# Patient Record
Sex: FEMALE | Race: WHITE | NOT HISPANIC OR LATINO | Employment: UNEMPLOYED | URBAN - METROPOLITAN AREA
[De-identification: names, ages, dates, MRNs, and addresses within clinical notes are randomized per-mention and may not be internally consistent; named-entity substitution may affect disease eponyms.]

---

## 2018-10-24 ENCOUNTER — NURSE TRIAGE (OUTPATIENT)
Dept: ADMINISTRATIVE | Facility: CLINIC | Age: 58
End: 2018-10-24

## 2018-10-24 NOTE — TELEPHONE ENCOUNTER
"    Reason for Disposition   MODERATE difficulty breathing (e.g., speaks in phrases, SOB even at rest, pulse 100-120) of new onset or worse than normal    Protocols used: ST BREATHING DIFFICULTY-A-OH    Fidelina called for appt this afternoon, and  sent her call to triage line due to her symptoms.  She is reporting SOB, with a feeling that "I can't get enough air.  I feel like I have to take a very deep breath to get enough air, and it is not enough. I am also ligththeaded and feel like I am going to pass out when I stand up.  I look in the mirror and I look very pale to me."  Self-administered two albuterol inhaler treatments today, but states they did not help. She went to [facility] ED two weeks ago for the same problems, and feels she is feeling worse now and states "little was done for me there. Fidelina states she has been caring for her older brother, and is under a lot of stress, as she is trying to find a place for him to live. She states her pcp is Ale Handy MD , with [facility], and I asked if she has followed up with Dr Mcdaniel since her last ED visit.  She states she did speak with her, and Dr Mcdaniel put in referral to Dr Mani Trent, cardiology.  Fidelina has consult appt with Dr Trent 11/01/18 but has not seen him yet in any capacity.  Message to Dr Mcdaniel and Dr Trent.  Please contact caller directly with any additional care advice.    "

## 2018-11-05 ENCOUNTER — OFFICE VISIT (OUTPATIENT)
Dept: CARDIOLOGY | Facility: CLINIC | Age: 58
End: 2018-11-05
Payer: COMMERCIAL

## 2018-11-05 VITALS
HEART RATE: 68 BPM | DIASTOLIC BLOOD PRESSURE: 95 MMHG | BODY MASS INDEX: 30.04 KG/M2 | SYSTOLIC BLOOD PRESSURE: 133 MMHG | HEIGHT: 65 IN | WEIGHT: 180.31 LBS | OXYGEN SATURATION: 98 %

## 2018-11-05 DIAGNOSIS — I10 HYPERTENSION: ICD-10-CM

## 2018-11-05 DIAGNOSIS — R76.8 POSITIVE ANA (ANTINUCLEAR ANTIBODY): ICD-10-CM

## 2018-11-05 DIAGNOSIS — R07.89 CHEST PAIN, ATYPICAL: ICD-10-CM

## 2018-11-05 DIAGNOSIS — M79.7 FIBROMYALGIA: ICD-10-CM

## 2018-11-05 DIAGNOSIS — I10 HYPERTENSION, UNSPECIFIED TYPE: ICD-10-CM

## 2018-11-05 DIAGNOSIS — E78.2 MIXED HYPERLIPIDEMIA: Primary | ICD-10-CM

## 2018-11-05 PROCEDURE — 99204 OFFICE O/P NEW MOD 45 MIN: CPT | Mod: S$GLB,,, | Performed by: INTERNAL MEDICINE

## 2018-11-05 PROCEDURE — 99999 PR PBB SHADOW E&M-EST. PATIENT-LVL III: CPT | Mod: PBBFAC,,, | Performed by: INTERNAL MEDICINE

## 2018-11-05 PROCEDURE — 93005 ELECTROCARDIOGRAM TRACING: CPT | Mod: S$GLB,,, | Performed by: INTERNAL MEDICINE

## 2018-11-05 PROCEDURE — 93010 ELECTROCARDIOGRAM REPORT: CPT | Mod: S$GLB,,, | Performed by: INTERNAL MEDICINE

## 2018-11-05 RX ORDER — ZOLPIDEM TARTRATE 5 MG/1
5 TABLET ORAL NIGHTLY PRN
COMMUNITY

## 2018-11-05 NOTE — PROGRESS NOTES
"Subjective:    Patient ID:  Fidelina Schuler is a 58 y.o. female who presents for follow-up of Chest Pain      HPI     Referred by Dr Mcdaniel    Called triage nurse 10/24/18  Protocols used: ST BREATHING DIFFICULTY-A-OH     Fidelina called for appt this afternoon, and  sent her call to triage line due to her symptoms.  She is reporting SOB, with a feeling that "I can't get enough air.  I feel like I have to take a very deep breath to get enough air, and it is not enough. I am also ligththeaded and feel like I am going to pass out when I stand up.  I look in the mirror and I look very pale to me."  Self-administered two albuterol inhaler treatments today, but states they did not help. She went to [facility] ED two weeks ago for the same problems, and feels she is feeling worse now and states "little was done for me there. Fidelina states she has been caring for her older brother, and is under a lot of stress, as she is trying to find a place for him to live. She states her pcp is Ale Handy MD , with [facility], and I asked if she has followed up with Dr Mcdaniel since her last ED visit.  She states she did speak with her, and Dr Mcdaniel put in referral to Dr Mani Trent, cardiology.  Fidelina has consult appt with Dr Trent 11/01/18 but has not seen him yet in any capacity.  Message to Dr Mcdaniel and Dr Trent.  Please contact caller directly with any additional care advice.    [facility]  Reports normal LHC 2 years ago  Has had 2 ER visits for CP and SOB in the last 2 weeks  EKG NSR TWI inferiorly and anteriorly             Review of Systems   Constitution: Negative for decreased appetite.   HENT: Negative for ear discharge.    Eyes: Negative for blurred vision.   Respiratory: Negative for hemoptysis.    Endocrine: Negative for polyphagia.   Hematologic/Lymphatic: Negative for adenopathy.   Skin: Negative for color change.   Musculoskeletal: Negative for joint swelling. "   Neurological: Negative for brief paralysis.   Psychiatric/Behavioral: Negative for hallucinations.        Objective:    Physical Exam   Constitutional: She is oriented to person, place, and time. She appears well-developed and well-nourished.   HENT:   Head: Normocephalic and atraumatic.   Eyes: Conjunctivae are normal. Pupils are equal, round, and reactive to light.   Neck: Normal range of motion. Neck supple.   Cardiovascular: Normal rate, normal heart sounds and intact distal pulses.   Pulmonary/Chest: Effort normal and breath sounds normal.   Abdominal: Soft. Bowel sounds are normal.   Musculoskeletal: Normal range of motion.   Neurological: She is alert and oriented to person, place, and time.   Skin: Skin is warm and dry.         Assessment:       1. Mixed hyperlipidemia    2. Hypertension, unspecified type    3. Positive ENRIQUE (antinuclear antibody)    4. Fibromyalgia    5. Chest pain, atypical         Plan:       Stress echo for FLORES and CP

## 2018-11-12 ENCOUNTER — HOSPITAL ENCOUNTER (OUTPATIENT)
Dept: CARDIOLOGY | Facility: HOSPITAL | Age: 58
Discharge: HOME | End: 2018-11-12
Attending: INTERNAL MEDICINE
Payer: COMMERCIAL

## 2018-11-12 VITALS — HEIGHT: 65 IN | HEART RATE: 84 BPM | BODY MASS INDEX: 29.99 KG/M2 | WEIGHT: 180 LBS

## 2018-11-12 DIAGNOSIS — E78.2 MIXED HYPERLIPIDEMIA: ICD-10-CM

## 2018-11-12 DIAGNOSIS — R76.8 POSITIVE ANA (ANTINUCLEAR ANTIBODY): ICD-10-CM

## 2018-11-12 DIAGNOSIS — M79.7 FIBROMYALGIA: ICD-10-CM

## 2018-11-12 DIAGNOSIS — R07.89 CHEST PAIN, ATYPICAL: ICD-10-CM

## 2018-11-12 DIAGNOSIS — I10 HYPERTENSION, UNSPECIFIED TYPE: ICD-10-CM

## 2018-11-12 LAB
AORTIC ROOT ANNULUS: 3.27 CM
AORTIC VALVE CUSP SEPERATION: 2.33 CM
ASCENDING AORTA: 3.47 CM
AV MEAN GRADIENT: 4.55 MMHG
AV PEAK GRADIENT: 7.84 MMHG
AV VALVE AREA: 4.03 CM2
BSA FOR ECHO PROCEDURE: 1.93 M2
CV ECHO LV RWT: 0.72 CM
CV STRESS BASE HR: 84
DIASTOLIC BLOOD PRESSURE: 85
DOP CALC AO PEAK VEL: 1.4 M/S
DOP CALC AO VTI: 30.6 CM
DOP CALC LVOT AREA: 3.97 CM2
DOP CALC LVOT DIAMETER: 2.25 CM
DOP CALC LVOT STROKE VOLUME: 123.43 CM3
DOP CALCLVOT PEAK VEL VTI: 31.06 CM
E WAVE DECELERATION TIME: 211.06 MSEC
E/A RATIO: 0.75
E/E' RATIO: 13.45
ECHO LV POSTERIOR WALL: 1.39 CM (ref 0.6–1.1)
FRACTIONAL SHORTENING: 27 % (ref 28–44)
INTERVENTRICULAR SEPTUM: 1.32 CM (ref 0.6–1.1)
IVRT: 0.16 MSEC
LA MAJOR: 4.79 CM
LA MINOR: 4.56 CM
LA WIDTH: 4.61 CM
LEFT ATRIUM SIZE: 3.73 CM
LEFT ATRIUM VOLUME INDEX: 35.4 ML/M2
LEFT ATRIUM VOLUME: 68.29 CM3
LEFT INTERNAL DIMENSION IN SYSTOLE: 2.83 CM (ref 2.1–4)
LEFT VENTRICLE DIASTOLIC VOLUME INDEX: 33.4 ML/M2
LEFT VENTRICLE DIASTOLIC VOLUME: 64.47 ML
LEFT VENTRICLE MASS INDEX: 97.8 G/M2
LEFT VENTRICLE SYSTOLIC VOLUME INDEX: 15.7 ML/M2
LEFT VENTRICLE SYSTOLIC VOLUME: 30.32 ML
LEFT VENTRICULAR INTERNAL DIMENSION IN DIASTOLE: 3.86 CM (ref 3.5–6)
LEFT VENTRICULAR MASS: 188.7 G
LV LATERAL E/E' RATIO: 10.57
LV SEPTAL E/E' RATIO: 18.5
MV PEAK A VEL: 0.99 M/S
MV PEAK E VEL: 0.74 M/S
OHS CV CPX 1 MINUTE RECOVERY HEART RATE: 123 BPM
OHS CV CPX 85 PERCENT MAX PREDICTED HEART RATE MALE: 133
OHS CV CPX ESTIMATED METS: 9
OHS CV CPX MAX PREDICTED HEART RATE: 157
OHS CV CPX PATIENT IS FEMALE: 1
OHS CV CPX PATIENT IS MALE: 0
OHS CV CPX PEAK DIASTOLIC BLOOD PRESSURE: 75 MMHG
OHS CV CPX PEAK HEAR RATE: 155
OHS CV CPX PEAK RATE PRESSURE PRODUCT: ABNORMAL
OHS CV CPX PEAK SYSTOLIC BLOOD PRESSURE: 211
OHS CV CPX PERCENT MAX PREDICTED HEART RATE ACHIEVED: 99
OHS CV CPX PERCENT TARGET HEART RATE ACHIEVED: 111.51
OHS CV CPX RATE PRESSURE PRODUCT PRESENTING: ABNORMAL
OHS CV CPX TARGET HEART RATE: 139
PISA TR MAX VEL: 2.23 M/S
PULM VEIN S/D RATIO: 1.68
PV PEAK D VEL: 0.37 M/S
PV PEAK S VEL: 0.62 M/S
PV PEAK VELOCITY: 1 CM/S
RA MAJOR: 5.09 CM
RA PRESSURE: 3 MMHG
RA WIDTH: 3.39 CM
RIGHT VENTRICULAR END-DIASTOLIC DIMENSION: 3.35 CM
RV TISSUE DOPPLER FREE WALL SYSTOLIC VELOCITY 1 (APICAL 4 CHAMBER VIEW): 11.53 M/S
SINUS: 3.27 CM
STJ: 2.97 CM
STRESS ANGINA INDEX: 0
STRESS ECHO POST EXERCISE DUR MIN: 7 MIN
STRESS ECHO POST EXERCISE DUR SEC: 22
STRESS ST DEPRESSION: 1 MM
SYSTOLIC BLOOD PRESSURE: 121
TDI LATERAL: 0.07
TDI SEPTAL: 0.04
TDI: 0.06
TR MAX PG: 19.89 MMHG
TRICUSPID ANNULAR PLANE SYSTOLIC EXCURSION: 1.46 CM
TV REST PULMONARY ARTERY PRESSURE: 22.89 MMHG

## 2018-11-12 PROCEDURE — 93325 DOPPLER ECHO COLOR FLOW MAPG: CPT | Mod: 26,,, | Performed by: INTERNAL MEDICINE

## 2018-11-12 PROCEDURE — 93320 DOPPLER ECHO COMPLETE: CPT | Mod: 26,,, | Performed by: INTERNAL MEDICINE

## 2018-11-12 PROCEDURE — 93320 DOPPLER ECHO COMPLETE: CPT

## 2018-11-12 PROCEDURE — 93351 STRESS TTE COMPLETE: CPT | Mod: 26,,, | Performed by: INTERNAL MEDICINE

## 2018-11-26 ENCOUNTER — OFFICE VISIT (OUTPATIENT)
Dept: CARDIOLOGY | Facility: CLINIC | Age: 58
End: 2018-11-26
Payer: COMMERCIAL

## 2018-11-26 VITALS
OXYGEN SATURATION: 98 % | HEART RATE: 72 BPM | SYSTOLIC BLOOD PRESSURE: 130 MMHG | WEIGHT: 173.94 LBS | BODY MASS INDEX: 28.98 KG/M2 | HEIGHT: 65 IN | DIASTOLIC BLOOD PRESSURE: 80 MMHG

## 2018-11-26 DIAGNOSIS — E78.5 HYPERLIPIDEMIA, UNSPECIFIED HYPERLIPIDEMIA TYPE: Primary | ICD-10-CM

## 2018-11-26 DIAGNOSIS — R07.89 CHEST PAIN, ATYPICAL: ICD-10-CM

## 2018-11-26 DIAGNOSIS — I10 HYPERTENSION, UNSPECIFIED TYPE: ICD-10-CM

## 2018-11-26 PROCEDURE — 99999 PR PBB SHADOW E&M-EST. PATIENT-LVL III: CPT | Mod: PBBFAC,,, | Performed by: INTERNAL MEDICINE

## 2018-11-26 PROCEDURE — 99213 OFFICE O/P EST LOW 20 MIN: CPT | Mod: S$GLB,,, | Performed by: INTERNAL MEDICINE

## 2018-11-26 RX ORDER — ASPIRIN 81 MG/1
81 TABLET ORAL DAILY
Refills: 0 | COMMUNITY
Start: 2018-11-26 | End: 2024-02-26

## 2018-11-26 RX ORDER — NITROGLYCERIN 0.4 MG/1
0.4 TABLET SUBLINGUAL EVERY 5 MIN PRN
Qty: 25 TABLET | Refills: 11 | Status: SHIPPED | OUTPATIENT
Start: 2018-11-26 | End: 2023-09-05

## 2018-11-26 NOTE — PROGRESS NOTES
"Subjective:    Patient ID:  Fidelina Schuler is a 58 y.o. female who presents for follow-up of Results      HPI     Referred by Dr Mcdaniel     Called triage nurse 10/24/18  Protocols used: ST BREATHING DIFFICULTY-A-OH     Fidelina called for appt this afternoon, and  sent her call to triage line due to her symptoms.  She is reporting SOB, with a feeling that "I can't get enough air.  I feel like I have to take a very deep breath to get enough air, and it is not enough. I am also ligththeaded and feel like I am going to pass out when I stand up.  I look in the mirror and I look very pale to me."  Self-administered two albuterol inhaler treatments today, but states they did not help. She went to [facility] ED two weeks ago for the same problems, and feels she is feeling worse now and states "little was done for me there. Fidelina states she has been caring for her older brother, and is under a lot of stress, as she is trying to find a place for him to live. She states her pcp is Ale Handy MD , with [facility], and I asked if she has followed up with Dr Mcdaniel since her last ED visit.  She states she did speak with her, and Dr Mcdaniel put in referral to Dr Mani Trent, cardiology.  Fidelina has consult appt with Dr Trent 11/01/18 but has not seen him yet in any capacity.  Message to Dr Mcdaniel and Dr Trent.  Please contact caller directly with any additional care advice.     11/5/18 [facility]  Reports normal LHC 2 years ago  Has had 2 ER visits for CP and SOB in the last 2 weeks  EKG NSR TWI inferiorly and anteriorly     Stress echo 11/12/18  · Abnormal exercise stress echo. Mild basal inferior hypokinesis post-stress.  · Left ventricle ejection fraction is normal at 60%  · No wall motion abnormalities at rest.  · Left ventricle shows concentric hypertrophy.  · The EKG portion of this study is positive for myocardial ischemia. (1-2mm ST depression in the inferolateral leads, Roby " 7:22, 9 METS, 94% MPHR)  · The stress echo portion of this study is positive for myocardial ischemia.    She reports that CP and SOB have improved    Review of Systems   Constitution: Negative for decreased appetite.   HENT: Negative for ear discharge.    Eyes: Negative for blurred vision.   Respiratory: Negative for hemoptysis.    Endocrine: Negative for polyphagia.   Hematologic/Lymphatic: Negative for adenopathy.   Skin: Negative for color change.   Musculoskeletal: Negative for joint swelling.   Neurological: Negative for brief paralysis.   Psychiatric/Behavioral: Negative for hallucinations.        Objective:    Physical Exam   Constitutional: She is oriented to person, place, and time. She appears well-developed and well-nourished.   HENT:   Head: Normocephalic and atraumatic.   Eyes: Conjunctivae are normal. Pupils are equal, round, and reactive to light.   Neck: Normal range of motion. Neck supple.   Cardiovascular: Normal rate, normal heart sounds and intact distal pulses.   Pulmonary/Chest: Effort normal and breath sounds normal.   Abdominal: Soft. Bowel sounds are normal.   Musculoskeletal: Normal range of motion.   Neurological: She is alert and oriented to person, place, and time.   Skin: Skin is warm and dry.         Assessment:       1. Hyperlipidemia, unspecified hyperlipidemia type    2. Hypertension, unspecified type    3. Chest pain, atypical         Plan:       I discussed her abnormal stress findings. Given her improved symptoms she would like to hold off on a repeat LHC. Will add prn NTG and ASA 81 qd. Would have a low threshold for LHC if symptoms recur  OV 3 months

## 2019-03-07 ENCOUNTER — OFFICE VISIT (OUTPATIENT)
Dept: CARDIOLOGY | Facility: CLINIC | Age: 59
End: 2019-03-07
Payer: COMMERCIAL

## 2019-03-07 VITALS
BODY MASS INDEX: 27.92 KG/M2 | WEIGHT: 167.56 LBS | HEIGHT: 65 IN | SYSTOLIC BLOOD PRESSURE: 129 MMHG | DIASTOLIC BLOOD PRESSURE: 88 MMHG | HEART RATE: 82 BPM

## 2019-03-07 DIAGNOSIS — I10 HTN (HYPERTENSION): ICD-10-CM

## 2019-03-07 DIAGNOSIS — I10 HYPERTENSION, UNSPECIFIED TYPE: ICD-10-CM

## 2019-03-07 DIAGNOSIS — R07.89 CHEST PAIN, ATYPICAL: ICD-10-CM

## 2019-03-07 DIAGNOSIS — E78.2 MIXED HYPERLIPIDEMIA: Primary | ICD-10-CM

## 2019-03-07 PROCEDURE — 99214 OFFICE O/P EST MOD 30 MIN: CPT | Mod: S$GLB,,, | Performed by: INTERNAL MEDICINE

## 2019-03-07 PROCEDURE — 99999 PR PBB SHADOW E&M-EST. PATIENT-LVL III: ICD-10-PCS | Mod: PBBFAC,,, | Performed by: INTERNAL MEDICINE

## 2019-03-07 PROCEDURE — 93000 EKG 12-LEAD: ICD-10-PCS | Mod: S$GLB,,, | Performed by: INTERNAL MEDICINE

## 2019-03-07 PROCEDURE — 99214 PR OFFICE/OUTPT VISIT, EST, LEVL IV, 30-39 MIN: ICD-10-PCS | Mod: S$GLB,,, | Performed by: INTERNAL MEDICINE

## 2019-03-07 PROCEDURE — 93000 ELECTROCARDIOGRAM COMPLETE: CPT | Mod: S$GLB,,, | Performed by: INTERNAL MEDICINE

## 2019-03-07 PROCEDURE — 99999 PR PBB SHADOW E&M-EST. PATIENT-LVL III: CPT | Mod: PBBFAC,,, | Performed by: INTERNAL MEDICINE

## 2019-03-07 NOTE — PROGRESS NOTES
"Subjective:    Patient ID:  Fidelina Schuler is a 58 y.o. female who presents for follow-up of Hypertension      HPI     Referred by Dr Mcdaniel     Called triage nurse 10/24/18  Protocols used: ST BREATHING DIFFICULTY-A-OH     Fidelina called for appt this afternoon, and  sent her call to triage line due to her symptoms.  She is reporting SOB, with a feeling that "I can't get enough air.  I feel like I have to take a very deep breath to get enough air, and it is not enough. I am also ligththeaded and feel like I am going to pass out when I stand up.  I look in the mirror and I look very pale to me."  Self-administered two albuterol inhaler treatments today, but states they did not help. She went to [facility] ED two weeks ago for the same problems, and feels she is feeling worse now and states "little was done for me there. Fidelina states she has been caring for her older brother, and is under a lot of stress, as she is trying to find a place for him to live. She states her pcp is Ale Handy MD , with [facility], and I asked if she has followed up with Dr Mcdaniel since her last ED visit.  She states she did speak with her, and Dr Mcdaniel put in referral to Dr Mani Trent, cardiology.  Fidelina has consult appt with Dr Trent 11/01/18 but has not seen him yet in any capacity.  Message to Dr Mcdaniel and Dr Trent.  Please contact caller directly with any additional care advice.     11/5/18 [facility]  Reports normal LHC 2 years ago  Has had 2 ER visits for CP and SOB in the last 2 weeks  EKG NSR TWI inferiorly and anteriorly     Stress echo 11/12/18  · Abnormal exercise stress echo. Mild basal inferior hypokinesis post-stress.  · Left ventricle ejection fraction is normal at 60%  · No wall motion abnormalities at rest.  · Left ventricle shows concentric hypertrophy.  · The EKG portion of this study is positive for myocardial ischemia. (1-2mm ST depression in the inferolateral leads, " Roby 7:22, 9 METS, 94% MPHR)  · The stress echo portion of this study is positive for myocardial ischemia.     She reports that CP and SOB have improved  I discussed her abnormal stress findings. Given her improved symptoms she would like to hold off on a repeat LHC. Will add prn NTG and ASA 81 qd. Would have a low threshold for LHC if symptoms recur    Denies any significant CP  Gets some pain between her shoulder blades but this goes away with a heating pad  Has not needed NTG  EKG NSR - minor NSSTT changes          Review of Systems   Constitution: Negative for decreased appetite.   HENT: Negative for ear discharge.    Eyes: Negative for blurred vision.   Respiratory: Negative for hemoptysis.    Endocrine: Negative for polyphagia.   Hematologic/Lymphatic: Negative for adenopathy.   Skin: Negative for color change.   Musculoskeletal: Negative for joint swelling.   Genitourinary: Negative for bladder incontinence.   Neurological: Negative for brief paralysis.   Psychiatric/Behavioral: Negative for hallucinations.   Allergic/Immunologic: Negative for hives.        Objective:    Physical Exam   Constitutional: She is oriented to person, place, and time. She appears well-developed and well-nourished.   HENT:   Head: Normocephalic and atraumatic.   Eyes: Conjunctivae are normal. Pupils are equal, round, and reactive to light.   Neck: Normal range of motion. Neck supple.   Cardiovascular: Normal rate, normal heart sounds and intact distal pulses.   Pulmonary/Chest: Effort normal and breath sounds normal.   Abdominal: Soft. Bowel sounds are normal.   Musculoskeletal: Normal range of motion.   Neurological: She is alert and oriented to person, place, and time.   Skin: Skin is warm and dry.         Assessment:       1. Mixed hyperlipidemia    2. Hypertension, unspecified type    3. Chest pain, atypical         Plan:       She wishes to continue to observe symptoms - if they progress would recommend LHC  OV 6 months

## 2019-09-23 ENCOUNTER — TELEPHONE (OUTPATIENT)
Dept: GASTROENTEROLOGY | Facility: CLINIC | Age: 59
End: 2019-09-23

## 2019-09-23 NOTE — TELEPHONE ENCOUNTER
----- Message from Elyssa Candelario sent at 9/23/2019  9:30 AM CDT -----  Contact: Self [phone]  Patient will be a knew patient and is requesting to be seen this week since she is having problems with her bowel.

## 2020-06-02 ENCOUNTER — NURSE TRIAGE (OUTPATIENT)
Dept: ADMINISTRATIVE | Facility: CLINIC | Age: 60
End: 2020-06-02

## 2020-06-02 NOTE — TELEPHONE ENCOUNTER
Spoke with patient she states that she had severe chest pain with tightness earlier.  She denies that she is having any chest pain at this time. Patient states that she is having irregular heart beats.  Advised patient to go to ER and have another drive.  Patient verbalized understanding.      Reason for Disposition   Heart beating irregularly or very rapidly    Additional Information   Negative: Severe difficulty breathing (e.g., struggling for each breath, speaks in single words)   Negative: Passed out (i.e., fainted, collapsed and was not responding)   Negative: Chest pain lasting longer than 5 minutes and ANY of the following:* Over 50 years old* Over 30 years old and at least one cardiac risk factor (i.e., high blood pressure, diabetes, high cholesterol, obesity, smoker or strong family history of heart disease)* Pain is crushing, pressure-like, or heavy * Took nitroglycerin and chest pain was not relieved* History of heart disease (i.e., angina, heart attack, bypass surgery, angioplasty, CHF)   Negative: Visible sweat on face or sweat dripping down face   Negative: Sounds like a life-threatening emergency to the triager   Negative: SEVERE chest pain   Negative: Pain also present in shoulder(s) or arm(s) or jaw   Negative: Difficulty breathing   Negative: Cocaine use within last 3 days   Negative: History of prior 'blood clot' in leg or lungs (i.e., deep vein thrombosis, pulmonary embolism)   Negative: Recent illness requiring prolonged bed rest (i.e., immobilization)   Negative: Hip or leg fracture in past 2 months (e.g, or had cast on leg or ankle)   Negative: Major surgery in the past month   Negative: Recent long-distance travel with prolonged time in car, bus, plane, or train (i.e., within past 2 weeks; 6 or more hours duration)    Protocols used: CHEST PAIN-A-OH

## 2020-06-12 ENCOUNTER — OFFICE VISIT (OUTPATIENT)
Dept: CARDIOLOGY | Facility: CLINIC | Age: 60
End: 2020-06-12
Payer: COMMERCIAL

## 2020-06-12 ENCOUNTER — TELEPHONE (OUTPATIENT)
Dept: CARDIOLOGY | Facility: CLINIC | Age: 60
End: 2020-06-12

## 2020-06-12 VITALS
SYSTOLIC BLOOD PRESSURE: 132 MMHG | HEIGHT: 65 IN | BODY MASS INDEX: 31.59 KG/M2 | WEIGHT: 189.63 LBS | OXYGEN SATURATION: 95 % | HEART RATE: 64 BPM | DIASTOLIC BLOOD PRESSURE: 76 MMHG

## 2020-06-12 DIAGNOSIS — E78.2 MIXED HYPERLIPIDEMIA: Primary | ICD-10-CM

## 2020-06-12 DIAGNOSIS — R07.89 CHEST PAIN, ATYPICAL: ICD-10-CM

## 2020-06-12 DIAGNOSIS — I10 HYPERTENSION: ICD-10-CM

## 2020-06-12 DIAGNOSIS — I10 ESSENTIAL HYPERTENSION: ICD-10-CM

## 2020-06-12 DIAGNOSIS — M79.7 FIBROMYALGIA: ICD-10-CM

## 2020-06-12 PROCEDURE — 99999 PR PBB SHADOW E&M-EST. PATIENT-LVL IV: CPT | Mod: PBBFAC,,, | Performed by: INTERNAL MEDICINE

## 2020-06-12 PROCEDURE — 93000 ELECTROCARDIOGRAM COMPLETE: CPT | Mod: S$GLB,,, | Performed by: INTERNAL MEDICINE

## 2020-06-12 PROCEDURE — 99999 PR PBB SHADOW E&M-EST. PATIENT-LVL IV: ICD-10-PCS | Mod: PBBFAC,,, | Performed by: INTERNAL MEDICINE

## 2020-06-12 PROCEDURE — 99214 OFFICE O/P EST MOD 30 MIN: CPT | Mod: S$GLB,,, | Performed by: INTERNAL MEDICINE

## 2020-06-12 PROCEDURE — 93000 EKG 12-LEAD: ICD-10-PCS | Mod: S$GLB,,, | Performed by: INTERNAL MEDICINE

## 2020-06-12 PROCEDURE — 99214 PR OFFICE/OUTPT VISIT, EST, LEVL IV, 30-39 MIN: ICD-10-PCS | Mod: S$GLB,,, | Performed by: INTERNAL MEDICINE

## 2020-06-12 RX ORDER — SODIUM CHLORIDE 9 MG/ML
INJECTION, SOLUTION INTRAVENOUS CONTINUOUS
Status: CANCELLED | OUTPATIENT
Start: 2020-06-12

## 2020-06-12 RX ORDER — GABAPENTIN 300 MG/1
300 CAPSULE ORAL 3 TIMES DAILY
COMMUNITY
End: 2024-02-26

## 2020-06-12 NOTE — H&P
"[facility] [place] - Cardiology  History & Physical    Subjective:      Chief Complaint/Reason for Admission: [facility]    Fidelina Schuler is a 60 y.o. female.    Referred by Dr Mcdaniel     Called triage nurse 10/24/18  Protocols used: ST BREATHING DIFFICULTY-A-OH     Fidelina called for appt this afternoon, and  sent her call to triage line due to her symptoms.  She is reporting SOB, with a feeling that "I can't get enough air.  I feel like I have to take a very deep breath to get enough air, and it is not enough. I am also ligththeaded and feel like I am going to pass out when I stand up.  I look in the mirror and I look very pale to me."  Self-administered two albuterol inhaler treatments today, but states they did not help. She went to [facility] ED two weeks ago for the same problems, and feels she is feeling worse now and states "little was done for me there. Fidelina states she has been caring for her older brother, and is under a lot of stress, as she is trying to find a place for him to live. She states her pcp is Ale Handy MD , with [facility], and I asked if she has followed up with Dr Mcdaniel since her last ED visit.  She states she did speak with her, and Dr Mcdaniel put in referral to Dr Mani Trent, cardiology.  Fidelina has consult appt with Dr Trent 11/01/18 but has not seen him yet in any capacity.  Message to Dr Mcdaniel and Dr Trent.  Please contact caller directly with any additional care advice.     11/5/18 [facility]  Reports normal [facility] 2 years ago  Has had 2 ER visits for CP and SOB in the last 2 weeks  EKG NSR TWI inferiorly and anteriorly     Stress echo 11/12/18  · Abnormal exercise stress echo. Mild basal inferior hypokinesis post-stress.  · Left ventricle ejection fraction is normal at 60%  · No wall motion abnormalities at rest.  · Left ventricle shows concentric hypertrophy.  · The EKG portion of this study is positive for myocardial ischemia. (1-2mm ST depression " in the inferolateral leads, Roby 7:22, 9 METS, 94% MPHR)  · The stress echo portion of this study is positive for myocardial ischemia.     She reports that CP and SOB have improved  I discussed her abnormal stress findings. Given her improved symptoms she would like to hold off on a repeat LHC. Will add prn NTG and ASA 81 qd. Would have a low threshold for LHC if symptoms recur     3/7/19 Denies any significant CP  Gets some pain between her shoulder blades but this goes away with a heating pad  Has not needed NTG  EKG NSR - minor NSSTT changes   She wishes to continue to observe symptoms - if they progress would recommend LHC  OV 6 months    Called triage nurse 6/2/20  Spoke with patient she states that she had severe chest pain with tightness earlier.  She denies that she is having any chest pain at this time. Patient states that she is having irregular heart beats.  Advised patient to go to ER and have another drive.  Patient verbalized understanding.      6/12/20 Still with intermittent CP  EKG NSR NSSTT changes        Past Medical History:   Diagnosis Date    DDD (degenerative disc disease), lumbosacral     Depression     Fatty liver     Fibromyalgia     Hyperlipidemia     Hypertension     Hypothyroidism     hashimoto's     Internal hemorrhoids     Lichen sclerosus     Liver cyst     Urinary bladder neurogenic dysfunction     Vertigo      Past Surgical History:   Procedure Laterality Date    CARDIAC CATHETERIZATION  1/2012    Dr. Ansari    HYSTERECTOMY      TONSILLECTOMY, ADENOIDECTOMY      TUBAL LIGATION       Family History   Problem Relation Age of Onset    Asthma Mother     Heart disease Mother     Emphysema Mother     Glaucoma Mother     Heart disease Father     Cancer Father         colon    Macular degeneration Maternal Uncle     No Known Problems Sister     No Known Problems Brother     No Known Problems Maternal Aunt     No Known Problems Paternal Aunt     No Known Problems  Paternal Uncle     No Known Problems Maternal Grandmother     No Known Problems Maternal Grandfather     No Known Problems Paternal Grandmother     No Known Problems Paternal Grandfather     Amblyopia Neg Hx     Blindness Neg Hx     Cataracts Neg Hx     Diabetes Neg Hx     Hypertension Neg Hx     Retinal detachment Neg Hx     Strabismus Neg Hx     Stroke Neg Hx     Thyroid disease Neg Hx      Social History     Tobacco Use    Smoking status: Former Smoker     Packs/day: 0.50     Years: 15.00     Pack years: 7.50     Types: Cigarettes     Last attempt to quit: 2013     Years since quittin.7    Smokeless tobacco: Never Used   Substance Use Topics    Alcohol use: Yes     Comment: socially    Drug use: No         (Not in a hospital admission)  Review of patient's allergies indicates:   Allergen Reactions    Amlodipine Other (See Comments)    Codeine Other (See Comments)    Mobic [meloxicam] Other (See Comments)     Swelling in both legs    Phenytoin sodium extended Nausea And Vomiting        Review of Systems   All other systems reviewed and are negative.      Objective:      Vital Signs (Most Recent)  Pulse: 64 (20 1613)  BP: 132/76 (20 1613)  SpO2: 95 % (20 1613)    Vital Signs Range (Last 24H):  [unfilled]    Physical Exam   Constitutional: She is oriented to person, place, and time. She appears well-developed and well-nourished.   HENT:   Head: Normocephalic and atraumatic.   Eyes: Pupils are equal, round, and reactive to light. EOM are normal.   Neck: Normal range of motion. Neck supple.   Cardiovascular: Normal rate and regular rhythm.   Pulmonary/Chest: Effort normal and breath sounds normal.   Abdominal: Soft. Bowel sounds are normal.   Musculoskeletal: Normal range of motion.   Neurological: She is alert and oriented to person, place, and time.   Skin: Skin is warm and dry.       Data Review:    CBC:   Lab Results   Component Value Date    WBC 5.19 2011     RBC 4.18 11/09/2011    HGB 13.9 11/09/2011    HCT 39.1 11/09/2011     11/09/2011     BMP:   Lab Results   Component Value Date     11/09/2011     11/09/2011    K 3.3 (L) 11/09/2011     11/09/2011    CO2 25 11/09/2011    BUN 14 11/09/2011    CREATININE 0.7 11/09/2011    CALCIUM 9.7 11/09/2011      ECG: NSR NSSTT changes.     Assessment:      There are no hospital problems to display for this patient.    Recurrent CP with previous abnormal stress echo    Plan:      C - will need to stage PCI if needed

## 2020-06-12 NOTE — PROGRESS NOTES
"Subjective:    Patient ID:  Fidelina Schuler is a 60 y.o. female who presents for follow-up of Chest Pain      HPI     Referred by Dr Mcdaniel     Called triage nurse 10/24/18  Protocols used: ST BREATHING DIFFICULTY-A-OH     Fidelina called for appt this afternoon, and  sent her call to triage line due to her symptoms.  She is reporting SOB, with a feeling that "I can't get enough air.  I feel like I have to take a very deep breath to get enough air, and it is not enough. I am also ligththeaded and feel like I am going to pass out when I stand up.  I look in the mirror and I look very pale to me."  Self-administered two albuterol inhaler treatments today, but states they did not help. She went to [facility] ED two weeks ago for the same problems, and feels she is feeling worse now and states "little was done for me there. Fidelina states she has been caring for her older brother, and is under a lot of stress, as she is trying to find a place for him to live. She states her pcp is Ale Handy MD , with [facility], and I asked if she has followed up with Dr Mcdaniel since her last ED visit.  She states she did speak with her, and Dr Mcdaniel put in referral to Dr Mani Trent, cardiology.  Fidelina has consult appt with Dr Trent 11/01/18 but has not seen him yet in any capacity.  Message to Dr Mcdaniel and Dr Trent.  Please contact caller directly with any additional care advice.     11/5/18 [facility]  Reports normal LHC 2 years ago  Has had 2 ER visits for CP and SOB in the last 2 weeks  EKG NSR TWI inferiorly and anteriorly     Stress echo 11/12/18  · Abnormal exercise stress echo. Mild basal inferior hypokinesis post-stress.  · Left ventricle ejection fraction is normal at 60%  · No wall motion abnormalities at rest.  · Left ventricle shows concentric hypertrophy.  · The EKG portion of this study is positive for myocardial ischemia. (1-2mm ST depression in the inferolateral leads, " Roby 7:22, 9 METS, 94% MPHR)  · The stress echo portion of this study is positive for myocardial ischemia.     She reports that CP and SOB have improved  I discussed her abnormal stress findings. Given her improved symptoms she would like to hold off on a repeat LHC. Will add prn NTG and ASA 81 qd. Would have a low threshold for LHC if symptoms recur     3/7/19 Denies any significant CP  Gets some pain between her shoulder blades but this goes away with a heating pad  Has not needed NTG  EKG NSR - minor NSSTT changes   She wishes to continue to observe symptoms - if they progress would recommend LHC  OV 6 months    Called triage nurse 6/2/20  Spoke with patient she states that she had severe chest pain with tightness earlier.  She denies that she is having any chest pain at this time. Patient states that she is having irregular heart beats.  Advised patient to go to ER and have another drive.  Patient verbalized understanding.      6/12/20 Still with intermittent CP  EKG NSR NSSTT changes      Review of Systems   Constitution: Negative for decreased appetite.   HENT: Negative for ear discharge.    Eyes: Negative for blurred vision.   Respiratory: Negative for hemoptysis.    Endocrine: Negative for polyphagia.   Hematologic/Lymphatic: Negative for adenopathy.   Skin: Negative for color change.   Musculoskeletal: Negative for joint swelling.   Genitourinary: Negative for bladder incontinence.   Neurological: Negative for brief paralysis.   Psychiatric/Behavioral: Negative for hallucinations.   Allergic/Immunologic: Negative for hives.        Objective:    Physical Exam   Constitutional: She is oriented to person, place, and time. She appears well-developed and well-nourished.   HENT:   Head: Normocephalic and atraumatic.   Eyes: Pupils are equal, round, and reactive to light. Conjunctivae are normal.   Neck: Normal range of motion. Neck supple.   Cardiovascular: Normal rate, normal heart sounds and intact distal  pulses.   Pulmonary/Chest: Effort normal and breath sounds normal.   Abdominal: Soft. Bowel sounds are normal.   Musculoskeletal: Normal range of motion.   Neurological: She is alert and oriented to person, place, and time.   Skin: Skin is warm and dry.         Assessment:       1. Mixed hyperlipidemia    2. Chest pain, atypical    3. Fibromyalgia    4. Essential hypertension         Plan:       Recurrent CP - I Have recommended C and she agrees

## 2020-06-15 ENCOUNTER — HOSPITAL ENCOUNTER (OUTPATIENT)
Dept: PREADMISSION TESTING | Facility: HOSPITAL | Age: 60
Discharge: HOME | End: 2020-06-15
Attending: INTERNAL MEDICINE
Payer: COMMERCIAL

## 2020-06-15 VITALS
HEART RATE: 64 BPM | WEIGHT: 190.94 LBS | TEMPERATURE: 98 F | DIASTOLIC BLOOD PRESSURE: 87 MMHG | RESPIRATION RATE: 17 BRPM | OXYGEN SATURATION: 97 % | SYSTOLIC BLOOD PRESSURE: 129 MMHG | HEIGHT: 65 IN | BODY MASS INDEX: 31.81 KG/M2

## 2020-06-15 DIAGNOSIS — R07.89 CHEST PAIN, ATYPICAL: ICD-10-CM

## 2020-06-15 DIAGNOSIS — E78.2 MIXED HYPERLIPIDEMIA: ICD-10-CM

## 2020-06-15 DIAGNOSIS — Z01.818 PRE-OP TESTING: ICD-10-CM

## 2020-06-15 DIAGNOSIS — M79.7 FIBROMYALGIA: ICD-10-CM

## 2020-06-15 DIAGNOSIS — I10 HYPERTENSION: ICD-10-CM

## 2020-06-15 DIAGNOSIS — I10 ESSENTIAL HYPERTENSION: ICD-10-CM

## 2020-06-15 LAB
ANION GAP SERPL CALC-SCNC: 7 MMOL/L (ref 8–16)
APTT BLDCRRT: 31.4 SEC (ref 21–32)
BUN SERPL-MCNC: 15 MG/DL (ref 6–20)
CALCIUM SERPL-MCNC: 9.7 MG/DL (ref 8.7–10.5)
CHLORIDE SERPL-SCNC: 106 MMOL/L (ref 95–110)
CO2 SERPL-SCNC: 26 MMOL/L (ref 23–29)
CREAT SERPL-MCNC: 1 MG/DL (ref 0.5–1.4)
ERYTHROCYTE [DISTWIDTH] IN BLOOD BY AUTOMATED COUNT: 12.2 % (ref 11.5–14.5)
EST. GFR  (AFRICAN AMERICAN): >60 ML/MIN/1.73 M^2
EST. GFR  (NON AFRICAN AMERICAN): >60 ML/MIN/1.73 M^2
GLUCOSE SERPL-MCNC: 93 MG/DL (ref 70–110)
HCT VFR BLD AUTO: 41.9 % (ref 37–48.5)
HGB BLD-MCNC: 14.2 G/DL (ref 12–16)
INR PPP: 0.9 (ref 0.8–1.2)
MCH RBC QN AUTO: 32.4 PG (ref 27–31)
MCHC RBC AUTO-ENTMCNC: 33.9 G/DL (ref 32–36)
MCV RBC AUTO: 96 FL (ref 82–98)
PLATELET # BLD AUTO: 243 K/UL (ref 150–350)
PMV BLD AUTO: 10 FL (ref 9.2–12.9)
POTASSIUM SERPL-SCNC: 4.6 MMOL/L (ref 3.5–5.1)
PROTHROMBIN TIME: 10.4 SEC (ref 9–12.5)
RBC # BLD AUTO: 4.38 M/UL (ref 4–5.4)
SARS-COV-2 RDRP RESP QL NAA+PROBE: NEGATIVE
SODIUM SERPL-SCNC: 139 MMOL/L (ref 136–145)
WBC # BLD AUTO: 6.17 K/UL (ref 3.9–12.7)

## 2020-06-15 PROCEDURE — 85730 THROMBOPLASTIN TIME PARTIAL: CPT

## 2020-06-15 PROCEDURE — 80048 BASIC METABOLIC PNL TOTAL CA: CPT

## 2020-06-15 PROCEDURE — 85610 PROTHROMBIN TIME: CPT

## 2020-06-15 PROCEDURE — U0002 COVID-19 LAB TEST NON-CDC: HCPCS

## 2020-06-15 PROCEDURE — 85027 COMPLETE CBC AUTOMATED: CPT

## 2020-06-15 NOTE — DISCHARGE INSTRUCTIONS
"  Your surgery is scheduled for _Wednesday June 17, 2020___________________.                Emergency Room  Please report to SAME DAY SURGERY UNIT on the 2nd FLOOR at _8:00_ a.m.      If you need WHEELCHAIR assistance please call  [phone] from your cell phone or "0"  from the  \[facility] [place] [facility]\"" courtesy phone located to the right after you enter the hospital lobby.      INSTRUCTIONS IMPORTANT!!!  ¨ Do not eat or drink after 12 midnight-including water. OK to brush teeth, no   gum, candy or mints!    ¨ Take only these medicines with a small swallow of water-morning of surgery.  Take Metoprolol and Aspirin with water        _x___  Prep instructions:    SHOWER    _x___  Please shower using Hibiclens soap the night before AND  the morning of   your surgery/procedure. Do not use Hibiclens on your face or genitals        _x___  No shaving of procedural area at least 4-5 days before surgery due to  increased risk of skin irritation and/or possible infection.  __x__  Do not wear makeup, including mascara. WEARING EYE MAKEUP MAY LEAD TO SERIOUS EYE INJURY during surgery.  __x__  No powder, lotions or creams to your body.  ___x_  You may wear only deodorant on the day of surgery.  __x__  Please remove all jewelry, including piercings and leave at home.  __x__  No money or valuables needed. Please leave at home.  You may bring your   cell phone.  _x___  If going home the same day, arrange for a ride home. You will not be able to drive if Anesthesia was used.  _x___  Wear loose fitting clothing. Allow for dressings, bandages.  .      x        You MAY use Tylenol/acetaminophen until day of surgery.    _x___  Call MD for temperature above 101 degrees      .      I have read or had read and explained to me, and understand the above information.  Additional comments or instructions:Please call   [phone] if you have any questions regarding the instructions above.             "

## 2020-06-17 ENCOUNTER — HOSPITAL ENCOUNTER (OUTPATIENT)
Facility: HOSPITAL | Age: 60
Discharge: HOME | End: 2020-06-17
Attending: INTERNAL MEDICINE | Admitting: INTERNAL MEDICINE
Payer: COMMERCIAL

## 2020-06-17 VITALS
DIASTOLIC BLOOD PRESSURE: 69 MMHG | TEMPERATURE: 98 F | RESPIRATION RATE: 19 BRPM | OXYGEN SATURATION: 98 % | HEART RATE: 60 BPM | SYSTOLIC BLOOD PRESSURE: 110 MMHG | BODY MASS INDEX: 31.77 KG/M2 | WEIGHT: 190.94 LBS

## 2020-06-17 DIAGNOSIS — E78.2 MIXED HYPERLIPIDEMIA: ICD-10-CM

## 2020-06-17 DIAGNOSIS — M79.7 FIBROMYALGIA: ICD-10-CM

## 2020-06-17 DIAGNOSIS — Z01.818 PRE-OP TESTING: Primary | ICD-10-CM

## 2020-06-17 DIAGNOSIS — I10 HYPERTENSION: ICD-10-CM

## 2020-06-17 DIAGNOSIS — I10 ESSENTIAL HYPERTENSION: ICD-10-CM

## 2020-06-17 DIAGNOSIS — R07.89 CHEST PAIN, ATYPICAL: ICD-10-CM

## 2020-06-17 PROCEDURE — 93458 L HRT ARTERY/VENTRICLE ANGIO: CPT | Mod: 26,,, | Performed by: INTERNAL MEDICINE

## 2020-06-17 PROCEDURE — 25000003 PHARM REV CODE 250: Performed by: INTERNAL MEDICINE

## 2020-06-17 PROCEDURE — 93458 PR CATH PLACE/CORON ANGIO, IMG SUPER/INTERP,W LEFT HEART VENTRICULOGRAPHY: ICD-10-PCS | Mod: 26,,, | Performed by: INTERNAL MEDICINE

## 2020-06-17 PROCEDURE — 99152 PR MOD CONSCIOUS SEDATION, SAME PHYS, 5+ YRS, FIRST 15 MIN: ICD-10-PCS | Mod: ,,, | Performed by: INTERNAL MEDICINE

## 2020-06-17 PROCEDURE — 99152 MOD SED SAME PHYS/QHP 5/>YRS: CPT | Mod: ,,, | Performed by: INTERNAL MEDICINE

## 2020-06-17 PROCEDURE — C1887 CATHETER, GUIDING: HCPCS | Performed by: INTERNAL MEDICINE

## 2020-06-17 PROCEDURE — 99152 MOD SED SAME PHYS/QHP 5/>YRS: CPT | Performed by: INTERNAL MEDICINE

## 2020-06-17 PROCEDURE — 25500020 PHARM REV CODE 255: Performed by: INTERNAL MEDICINE

## 2020-06-17 PROCEDURE — 63600175 PHARM REV CODE 636 W HCPCS: Performed by: INTERNAL MEDICINE

## 2020-06-17 PROCEDURE — 93458 L HRT ARTERY/VENTRICLE ANGIO: CPT | Performed by: INTERNAL MEDICINE

## 2020-06-17 PROCEDURE — C1760 CLOSURE DEV, VASC: HCPCS | Performed by: INTERNAL MEDICINE

## 2020-06-17 PROCEDURE — C1894 INTRO/SHEATH, NON-LASER: HCPCS | Performed by: INTERNAL MEDICINE

## 2020-06-17 PROCEDURE — C1769 GUIDE WIRE: HCPCS | Performed by: INTERNAL MEDICINE

## 2020-06-17 DEVICE — ANGIO-SEAL VIP VASCULAR CLOSURE DEVICE
Type: IMPLANTABLE DEVICE | Site: GROIN | Status: FUNCTIONAL
Brand: ANGIO-SEAL

## 2020-06-17 RX ORDER — SODIUM CHLORIDE 9 MG/ML
INJECTION, SOLUTION INTRAVENOUS CONTINUOUS
Status: DISCONTINUED | OUTPATIENT
Start: 2020-06-17 | End: 2020-06-17 | Stop reason: HOSPADM

## 2020-06-17 RX ORDER — HYDROCODONE BITARTRATE AND ACETAMINOPHEN 5; 325 MG/1; MG/1
1 TABLET ORAL EVERY 4 HOURS PRN
Status: DISCONTINUED | OUTPATIENT
Start: 2020-06-17 | End: 2020-06-17 | Stop reason: HOSPADM

## 2020-06-17 RX ORDER — LIDOCAINE HYDROCHLORIDE 10 MG/ML
INJECTION, SOLUTION EPIDURAL; INFILTRATION; INTRACAUDAL; PERINEURAL
Status: DISCONTINUED | OUTPATIENT
Start: 2020-06-17 | End: 2020-06-17 | Stop reason: HOSPADM

## 2020-06-17 RX ORDER — FENTANYL CITRATE 50 UG/ML
INJECTION, SOLUTION INTRAMUSCULAR; INTRAVENOUS
Status: DISCONTINUED | OUTPATIENT
Start: 2020-06-17 | End: 2020-06-17 | Stop reason: HOSPADM

## 2020-06-17 RX ORDER — NITROGLYCERIN 0.4 MG/1
0.4 TABLET SUBLINGUAL EVERY 5 MIN PRN
Status: DISCONTINUED | OUTPATIENT
Start: 2020-06-17 | End: 2020-06-17 | Stop reason: HOSPADM

## 2020-06-17 RX ORDER — SODIUM CHLORIDE 9 MG/ML
INJECTION, SOLUTION INTRAVENOUS CONTINUOUS
Status: ACTIVE | OUTPATIENT
Start: 2020-06-17 | End: 2020-06-17

## 2020-06-17 RX ORDER — MIDAZOLAM HYDROCHLORIDE 1 MG/ML
INJECTION, SOLUTION INTRAMUSCULAR; INTRAVENOUS
Status: DISCONTINUED | OUTPATIENT
Start: 2020-06-17 | End: 2020-06-17 | Stop reason: HOSPADM

## 2020-06-17 RX ORDER — ACETAMINOPHEN 325 MG/1
650 TABLET ORAL EVERY 4 HOURS PRN
Status: DISCONTINUED | OUTPATIENT
Start: 2020-06-17 | End: 2020-06-17 | Stop reason: HOSPADM

## 2020-06-17 RX ADMIN — SODIUM CHLORIDE: 0.9 INJECTION, SOLUTION INTRAVENOUS at 08:06

## 2020-06-17 NOTE — Clinical Note
Catheter is inserted into the and hemodynamics recorded ostium   left main. Angiography performed of the left coronary arteries in multiple views.

## 2020-06-17 NOTE — H&P
"[facility] - Cardiology  History & Physical    Subjective:      Chief Complaint/Reason for Admission: [facility]    Fidelina Schuler is a 60 y.o. female.    Referred by Dr Mcdaniel     Called triage nurse 10/24/18  Protocols used: ST BREATHING DIFFICULTY-A-OH     Fidelina called for appt this afternoon, and  sent her call to triage line due to her symptoms.  She is reporting SOB, with a feeling that "I can't get enough air.  I feel like I have to take a very deep breath to get enough air, and it is not enough. I am also ligththeaded and feel like I am going to pass out when I stand up.  I look in the mirror and I look very pale to me."  Self-administered two albuterol inhaler treatments today, but states they did not help. She went to [facility] ED two weeks ago for the same problems, and feels she is feeling worse now and states "little was done for me there. Fidelina states she has been caring for her older brother, and is under a lot of stress, as she is trying to find a place for him to live. She states her pcp is Ale Handy MD , with [facility], and I asked if she has followed up with Dr Mcdaniel since her last ED visit.  She states she did speak with her, and Dr Mcdaniel put in referral to Dr Mani Trent, cardiology.  Fidelina has consult appt with Dr Trent 11/01/18 but has not seen him yet in any capacity.  Message to Dr Mcdaniel and Dr Trent.  Please contact caller directly with any additional care advice.     11/5/18 [facility]  Reports normal [facility] 2 years ago  Has had 2 ER visits for CP and SOB in the last 2 weeks  EKG NSR TWI inferiorly and anteriorly     Stress echo 11/12/18  · Abnormal exercise stress echo. Mild basal inferior hypokinesis post-stress.  · Left ventricle ejection fraction is normal at 60%  · No wall motion abnormalities at rest.  · Left ventricle shows concentric hypertrophy.  · The EKG portion of this study is positive for myocardial ischemia. (1-2mm ST depression " in the inferolateral leads, Roby 7:22, 9 METS, 94% MPHR)  · The stress echo portion of this study is positive for myocardial ischemia.     She reports that CP and SOB have improved  I discussed her abnormal stress findings. Given her improved symptoms she would like to hold off on a repeat LHC. Will add prn NTG and ASA 81 qd. Would have a low threshold for LHC if symptoms recur     3/7/19 Denies any significant CP  Gets some pain between her shoulder blades but this goes away with a heating pad  Has not needed NTG  EKG NSR - minor NSSTT changes   She wishes to continue to observe symptoms - if they progress would recommend LHC  OV 6 months    Called triage nurse 6/2/20  Spoke with patient she states that she had severe chest pain with tightness earlier.  She denies that she is having any chest pain at this time. Patient states that she is having irregular heart beats.  Advised patient to go to ER and have another drive.  Patient verbalized understanding.      6/12/20 Still with intermittent CP  EKG NSR NSSTT changes        Past Medical History:   Diagnosis Date    DDD (degenerative disc disease), lumbosacral     Depression     Fatty liver     Fibromyalgia     Hyperlipidemia     Hypertension     Hypothyroidism     hashimoto's     Internal hemorrhoids     Lichen sclerosus     Liver cyst     Urinary bladder neurogenic dysfunction     Vertigo      Past Surgical History:   Procedure Laterality Date    CARDIAC CATHETERIZATION  1/2012    Dr. Ansari    CHOLECYSTECTOMY      HYSTERECTOMY      TONSILLECTOMY, ADENOIDECTOMY      TUBAL LIGATION       Family History   Problem Relation Age of Onset    Asthma Mother     Heart disease Mother     Emphysema Mother     Glaucoma Mother     Heart disease Father     Cancer Father         colon    Macular degeneration Maternal Uncle     No Known Problems Sister     No Known Problems Brother     No Known Problems Maternal Aunt     No Known Problems Paternal Aunt      No Known Problems Paternal Uncle     No Known Problems Maternal Grandmother     No Known Problems Maternal Grandfather     No Known Problems Paternal Grandmother     No Known Problems Paternal Grandfather     Amblyopia Neg Hx     Blindness Neg Hx     Cataracts Neg Hx     Diabetes Neg Hx     Hypertension Neg Hx     Retinal detachment Neg Hx     Strabismus Neg Hx     Stroke Neg Hx     Thyroid disease Neg Hx      Social History     Tobacco Use    Smoking status: Former Smoker     Packs/day: 0.50     Years: 15.00     Pack years: 7.50     Types: Cigarettes     Quit date: 2013     Years since quittin.7    Smokeless tobacco: Never Used   Substance Use Topics    Alcohol use: Yes     Comment: socially    Drug use: No       PTA Medications   Medication Sig    aspirin (ECOTRIN) 81 MG EC tablet Take 1 tablet (81 mg total) by mouth once daily.    buPROPion (WELLBUTRIN SR) 150 MG TBSR 12 hr tablet Take by mouth once daily.     FLUZONE QUAD [id number] 60 mcg (15 mcg x 4)/0.5 mL Susp     gabapentin (NEURONTIN) 300 MG capsule Take 300 mg by mouth 3 (three) times daily.    levothyroxine (SYNTHROID) 100 MCG tablet TAKE ONE TABLET DAILY FOR THYROID    metoprolol succinate (TOPROL-XL) 50 MG 24 hr tablet     nitroGLYCERIN (NITROSTAT) 0.4 MG SL tablet Place 1 tablet (0.4 mg total) under the tongue every 5 (five) minutes as needed for Chest pain.    pantoprazole (PROTONIX) 40 MG tablet TAKE ONE TABLET BY MOUTH EVERY DAY    PROAIR HFA 90 mcg/actuation inhaler     spironolactone (ALDACTONE) 25 MG tablet     zolpidem (AMBIEN) 5 MG Tab Take 5 mg by mouth nightly as needed.     Review of patient's allergies indicates:   Allergen Reactions    Amlodipine Other (See Comments)    Codeine Other (See Comments)    Mobic [meloxicam] Other (See Comments)     Swelling in both legs    Phenytoin sodium extended Nausea And Vomiting        Review of Systems   All other systems reviewed and are  negative.      Objective:      Vital Signs (Most Recent)       Vital Signs Range (Last 24H):  [unfilled]    Physical Exam  Constitutional:       Appearance: She is well-developed.   HENT:      Head: Normocephalic and atraumatic.   Eyes:      Pupils: Pupils are equal, round, and reactive to light.   Neck:      Musculoskeletal: Normal range of motion and neck supple.   Cardiovascular:      Rate and Rhythm: Normal rate and regular rhythm.   Pulmonary:      Effort: Pulmonary effort is normal.      Breath sounds: Normal breath sounds.   Abdominal:      General: Bowel sounds are normal.      Palpations: Abdomen is soft.   Musculoskeletal: Normal range of motion.   Skin:     General: Skin is warm and dry.   Neurological:      Mental Status: She is alert and oriented to person, place, and time.         Data Review:    CBC:   Lab Results   Component Value Date    WBC 6.17 06/15/2020    RBC 4.38 06/15/2020    HGB 14.2 06/15/2020    HCT 41.9 06/15/2020     06/15/2020     BMP:   Lab Results   Component Value Date    GLU 93 06/15/2020     06/15/2020    K 4.6 06/15/2020     06/15/2020    CO2 26 06/15/2020    BUN 15 06/15/2020    CREATININE 1.0 06/15/2020    CALCIUM 9.7 06/15/2020      ECG: NSR NSSTT changes.     Assessment:      There are no hospital problems to display for this patient.    Recurrent CP with previous abnormal stress echo    Plan:      LHC - will need to stage PCI if needed

## 2020-06-17 NOTE — DISCHARGE SUMMARY
"  [facility]  Cardiology  Discharge Summary      Patient Name: Fidelnia Schuler  MRN: [id number]  Admission Date: 6/17/2020  Hospital Length of Stay: 0 days  Discharge Date and Time:  06/17/2020 10:42 AM  Attending Physician: Mani Trent MD    Discharging Provider: Mani Trent MD  Primary Care Physician: Ale Handy MD    HPI:   Referred by Dr Mcdaniel     Called triage nurse 10/24/18  Protocols used: ST BREATHING DIFFICULTY-A-OH     Fidelina called for appt this afternoon, and  sent her call to triage line due to her symptoms.  She is reporting SOB, with a feeling that "I can't get enough air.  I feel like I have to take a very deep breath to get enough air, and it is not enough. I am also ligththeaded and feel like I am going to pass out when I stand up.  I look in the mirror and I look very pale to me."  Self-administered two albuterol inhaler treatments today, but states they did not help. She went to [facility] ED two weeks ago for the same problems, and feels she is feeling worse now and states "little was done for me there. Fidelina states she has been caring for her older brother, and is under a lot of stress, as she is trying to find a place for him to live. She states her pcp is Ale Handy MD , with [facility], and I asked if she has followed up with Dr Mcdaniel since her last ED visit.  She states she did speak with her, and Dr Mcdaniel put in referral to Dr Mani Trent, cardiology.  Fidelina has consult appt with Dr Trent 11/01/18 but has not seen him yet in any capacity.  Message to Dr Mcdaniel and Dr Trent.  Please contact caller directly with any additional care advice.     11/5/18 [facility]  Reports normal LHC 2 years ago  Has had 2 ER visits for CP and SOB in the last 2 weeks  EKG NSR TWI inferiorly and anteriorly     Stress echo 11/12/18  · Abnormal exercise stress echo. Mild basal inferior hypokinesis post-stress.  · Left " ventricle ejection fraction is normal at 60%  · No wall motion abnormalities at rest.  · Left ventricle shows concentric hypertrophy.  · The EKG portion of this study is positive for myocardial ischemia. (1-2mm ST depression in the inferolateral leads, Roby 7:22, 9 METS, 94% MPHR)  · The stress echo portion of this study is positive for myocardial ischemia.     She reports that CP and SOB have improved  I discussed her abnormal stress findings. Given her improved symptoms she would like to hold off on a repeat LHC. Will add prn NTG and ASA 81 qd. Would have a low threshold for LHC if symptoms recur     3/7/19 Denies any significant CP  Gets some pain between her shoulder blades but this goes away with a heating pad  Has not needed NTG  EKG NSR - minor NSSTT changes   She wishes to continue to observe symptoms - if they progress would recommend LHC  OV 6 months     Called triage nurse 6/2/20  Spoke with patient she states that she had severe chest pain with tightness earlier.  She denies that she is having any chest pain at this time. Patient states that she is having irregular heart beats.  Advised patient to go to ER and have another drive.  Patient verbalized understanding.       6/12/20 Still with intermittent CP  EKG NSR NSSTT changes    Procedure(s) (LRB):  Left heart cath (Left)     Indwelling Lines/Drains at time of discharge:  Lines/Drains/Airways     None                 Hospital Course:  6/17/20 [facility] - EDP 12, Normal coronaries    Angioseal  Home today    Consults:     Significant Diagnostic Studies: Angiography:     Pending Diagnostic Studies:     None          Final Active Diagnoses:    Diagnosis Date Noted POA    PRINCIPAL PROBLEM:  Chest pain, atypical [R07.89] 11/05/2018 Yes    Pre-op testing [Z01.818] 06/17/2020 Not Applicable    Mixed hyperlipidemia [E78.2] 09/25/2012 Yes      Problems Resolved During this Admission:     * Chest pain, atypical  6/17/20 [facility] - EDP 12, Normal  coronaries    Angioseal  Home today        Discharged Condition: good    Disposition: Home or Self Care    Follow Up:    Patient Instructions:   No discharge procedures on file.  Medications:  Reconciled Home Medications:      Medication List      CONTINUE taking these medications    aspirin 81 MG EC tablet  Commonly known as: ECOTRIN  Take 1 tablet (81 mg total) by mouth once daily.     buPROPion 150 MG TBSR 12 hr tablet  Commonly known as: WELLBUTRIN SR  Take by mouth once daily.     FLUZONE QUAD [id number] 60 mcg (15 mcg x 4)/0.5 mL Susp  Generic drug: flu vaccine [id number](6mos up)     gabapentin 300 MG capsule  Commonly known as: NEURONTIN  Take 300 mg by mouth 3 (three) times daily.     levothyroxine 100 MCG tablet  Commonly known as: SYNTHROID  TAKE ONE TABLET DAILY FOR THYROID     metoprolol succinate 50 MG 24 hr tablet  Commonly known as: TOPROL-XL     nitroGLYCERIN 0.4 MG SL tablet  Commonly known as: NITROSTAT  Place 1 tablet (0.4 mg total) under the tongue every 5 (five) minutes as needed for Chest pain.     pantoprazole 40 MG tablet  Commonly known as: PROTONIX  TAKE ONE TABLET BY MOUTH EVERY DAY     PROAIR HFA 90 mcg/actuation inhaler  Generic drug: albuterol     spironolactone 25 MG tablet  Commonly known as: ALDACTONE     zolpidem 5 MG Tab  Commonly known as: AMBIEN  Take 5 mg by mouth nightly as needed.            Time spent on the discharge of patient: 30 minutes    Mani Trent MD  Cardiology  [facility]

## 2020-06-17 NOTE — PROCEDURES
Fidelina Schuler is a 60 y.o. female patient.    Temp: 97.9 °F (36.6 °C) (06/17/20 0833)  Pulse: 97 (06/17/20 0833)  Resp: 18 (06/17/20 0833)  BP: 134/80 (06/17/20 0833)  SpO2: 97 % (06/17/20 0833)  Weight: 86.6 kg (190 lb 14.7 oz) (06/15/20 1502)       Procedures     [facility]   Dr Trent  Pre-op Dx CP  Post-op Dx same  Specimen none  EBL < 50 cc    6/17/20 [facility] - EDP 12, Normal coronaries    Angioseal  Home today    Mani Trent  6/17/2020

## 2020-06-17 NOTE — Clinical Note
38 ml injected throughout the case. 40 mL total wasted during the case. 78 mL total used in the case.

## 2020-06-17 NOTE — DISCHARGE INSTRUCTIONS
ANGIOGRAM INSTRUCTIONS                                           Drink plenty of fluids for the next 48 hours and follow your doctor's diet orders.    Rest for the next 72 hours.   Try not to keep the injected leg bent for a long period of time.  Remove the dressing in 24 hours, and you may shower. Clean the area with soap and water, and apply a band aid for the next 5 days.                                                                 No  Lifting over 5-10 lbs., that is, not more than 1 gallon of water, or straining for 72 hours.    No driving, no drinking alcohol, and no signing legal documents for the next 24 hours.    Call your doctor for elevated temperature, shortness of breath, chest pain, or cold discolored foot or leg.    If oozing occurs at the injections site, lie down.  Apply pressure with a clean wash cloth for 20 to 30 minutes and call your doctor.    If severe bleeding occurs, lie down, apply pressure.  Call 911 and request an ambulance to take you to the nearest hospital emergency room.    Continue to take your regular medications as instructed.      Follow the instructions in the handout given to you.           Discharge Instructions for Cardiac Catheterization  Cardiac catheterization is a procedure to look for blocked areas in the blood vessels that send blood to the heart. A thin, flexible tube (catheter) is put in a blood vessel in your groin or arm. The healthcare provider injects contrast fluid into your blood, which then flows to your heart. X-rays pictures are taken of your heart. Your provider will review the results with you. Be sure to ask any questions you have before you leave. This sheet will help you take care of yourself at home.  Home care  · Only do light and easy activities for the next 2 to 3 days. Ask for help with chores and errands while you recover. Have someone drive you to your appointments.  · Don't lift anything heavy for a while. Your healthcare team will tell you  when it's safe to lift again.  · Ask your healthcare team when you can expect to return to work. Unless your job involves lifting, you may be able to return to your normal activities within a couple of days.  · Take your medicines as directed. Don't skip doses.  · Drink 6 to 8 glasses of water a day. This is to help flush the contrast dye out of your body. Call your healthcare team if your urine has any change in color.  · Take your temperature each day for 7 days. If you feel cold and clammy or start sweating, take your temperature right away and call your healthcare team.  · Check your incisions every day for signs of infection. These include redness, swelling, and drainage. It is normal to have a small bruise or bump where the catheter was inserted. A bruise that is getting larger is not normal and should be reported to your healthcare team. If you see blood forming in the incision, call your healthcare team. Go to the emergency department if you have uncontrolled bleeding from the artery site. This is especially true if you take medicines that make it difficult for your blood to clot. Examples are aspirin, clopidogrel, and warfarin.  · Eat a healthy diet. Make sure it is low in fat, salt, and cholesterol. Ask your healthcare team for diet information.  · Stop smoking. Enroll in a stop-smoking program or ask your healthcare team for help. Stop-smoking programs can be life saving.  · Exercise as your healthcare team tells you to. Your healthcare team may recommend you start a cardiac rehabilitation program. Cardiac rehab is an exercise program in which trained healthcare staff watch your progress and stress on your heart while you exercise. Ask your team how to enroll.  · Don't swim or take baths until your healthcare team says its OK. You can shower the day after the procedure. Keep the site clean and dry. This keeps the incision from getting wet and infected until the skin and artery can heal.  Follow-up  care  · Make a follow-up appointment as advised by our staff. It's common to have a follow-up appointment 2 to 4 weeks after an angioplasty or coronary stent procedure.  · Make a yearly appointment, too. This is to make sure you are still doing well and not having any new symptoms.  · Don't wait for a follow-up appointment if your medicines aren't working or you are having heart-related symptoms.  When to seek medical care  Call your healthcare provider right away if you have any of the following:  · Chest pain  · Constant or increasing pain or numbness in your leg  · Fever of 100.4°F (38.0°C) or higher, or as directed by your healthcare provider  · Symptoms of infection. These include redness, swelling, drainage, or warmth at the incision site.  · Shortness of breath  · A leg that feels cold or appears blue  · Bleeding, bruising, or a lot of swelling where the catheter was inserted  · Blood in your urine  · Black or tarry stools  · Any unusual bleeding   Date Last Reviewed: 10/1/2016  © 2116-8503 [employer]. [address] [place] [address]. All rights reserved. This information is not intended as a substitute for professional medical care. Always follow your healthcare professional's instructions.        Bleeding or Hematoma After Cardiac Catheterization  You recently had cardiac catheterization. A catheter was put into your body through a puncture site in your groin or arm. You now have bleeding from this site. When bleeding occurs, it may drip or spurt from the site. Or it may collect in a lump (hematoma) under the skin. In the emergency department, pressure will be put on the site to stop bleeding. You will be sent home when the bleeding stops. To prevent repeat bleeding, take some precautions at home. If the bleeding starts again, follow the advice below.    Home care  For the next 48 hours:  · Don't do any strenuous activity.  · Don't climb stairs.  · Don't lift anything heavy.  · If  "the puncture site is in your arm or wrist, don't lie on that arm.  · If your puncture site is in the groin, don't strain at bowel movements.  · Don't scrub the site when bathing. It is fine to get it wet after your healthcare provider says it is OK, but don't scrub it or massage it.  If bleeding happens again, call 911. Take the following steps to stop the bleeding until help arrives:  · Lie on your back.  · Place a clean cloth or gauze pad over the puncture site. Then hold firm pressure right on the site. Or have someone else apply firm pressure using the gauze pad or washcloth.  · Keep your arm straight and raised above the level of your heart if the site is in your arm or wrist. If the site is in your groin, have someone else hold pressure on the site. If you dont have someone to do this, put a 5-pound bag of rice or flour on the site and apply pressure with your hand over the bag.  · Don't press too hard! If you press too hard, your leg and foot (or arm and hand) will not get blood flow and the skin under your toenails or fingernails may turn white. The skin should look pink, like the toenails on your other foot. When you (or someone) presses on the toenail it will turn white, but when you stop pressing on the nail it will turn pink again. This is called "capillary refill." If there is someone with you, he or she can check it.  · If your toes, foot, or leg start feeling numb, tingly, or cold, ease up on the pressure, because you are probably pressing too hard.  · As soon as emergency care arrives, they will take over your care.  Follow-up care  Follow up with your healthcare provider, or as advised. Ask your healthcare provider for a contact number to call.  Call 911  Call 911 if any of these occur:  · Chest pain or pressure  · Any bleeding from the site  · Feeling weak or faint  · Trouble breathing  · Lump (hematoma) is quickly getting larger  · Coolness, numbness, tingling, or skin color changes in the leg or " arm with the puncture site  When to seek medical advice  Call your healthcare provider right away if any of these occur:  · Increased pain, redness, swelling, or drainage from the puncture site  · Nausea or vomiting  Date Last Reviewed: 1/11/2016  © 7010-1964 [employer] [address] [place] [address]. All rights reserved. This information is not intended as a substitute for professional medical care. Always follow your healthcare professional's instructions.        Fall Prevention  Millions of people fall every year and injure themselves. You may have had anesthesia or sedation which may increase your risk of falling. You may have health issues that put you at an increased risk of falling.     Here are ways to reduce your risk of falling.  ·   · Make your home safe by keeping walkways clear of objects you may trip over.  · Use non-slip pads under rugs. Do not use area rugs or small throw rugs.  · Use non-slip mats in bathtubs and showers.  · Install handrails and lights on staircases.  · Do not walk in poorly lit areas.  · Do not stand on chairs or wobbly ladders.  · Use caution when reaching overhead or looking upward. This position can cause a loss of balance.  · Be sure your shoes fit properly, have non-slip bottoms and are in good condition.   · Wear shoes both inside and out. Avoid going barefoot or wearing slippers.  · Be cautious when going up and down stairs, curbs, and when walking on uneven sidewalks.  · If your balance is poor, consider using a cane or walker.  · If your fall was related to alcohol use, stop or limit alcohol intake.   · If your fall was related to use of sleeping medicines, talk to your doctor about this. You may need to reduce your dosage at bedtime if you awaken during the night to go to the bathroom.    · To reduce the need for nighttime bathroom trips:  ¨ Avoid drinking fluids for several hours before going to bed  ¨ Empty your bladder before going to  bed  ¨ Men can keep a urinal at the bedside  · Stay as active as you can. Balance, flexibility, strength, and endurance all come from exercise. They all play a role in preventing falls. Ask your healthcare provider which types of activity are right for you.  · Get your vision checked on a regular basis.  · If you have pets, know where they are before you stand up or walk so you don't trip over them.  · Use night lights.

## 2020-06-17 NOTE — PROCEDURES
Fidelina Schuler is a 60 y.o. female patient.    Temp: 97.9 °F (36.6 °C) (06/17/20 0833)  Pulse: 97 (06/17/20 0833)  Resp: 18 (06/17/20 0833)  BP: 134/80 (06/17/20 0833)  SpO2: 97 % (06/17/20 0833)  Weight: 86.6 kg (190 lb 14.7 oz) (06/15/20 1502)       Procedures     Pre-sedation Assessment:    1. ASA Score: ASA 2 - Patient with mild systemic disease with no functional limitations  2. Mallampati Class: II (hard and soft palate, upper portion of tonsils anduvula visible)  3. Patient or family history of any reaction to anesthesia or sedation: No  4. Plan for Sedation: Moderate  5. H&P within 30 days of the procedure and updated within 24 hrs of admission or registration: Yes    Mani Trent  6/17/2020

## 2020-06-17 NOTE — HPI
"Referred by Dr Mcdaniel     Called triage nurse 10/24/18  Protocols used: ST BREATHING DIFFICULTY-A-OH     Fidelina called for appt this afternoon, and  sent her call to triage line due to her symptoms.  She is reporting SOB, with a feeling that "I can't get enough air.  I feel like I have to take a very deep breath to get enough air, and it is not enough. I am also ligththeaded and feel like I am going to pass out when I stand up.  I look in the mirror and I look very pale to me."  Self-administered two albuterol inhaler treatments today, but states they did not help. She went to [facility] ED two weeks ago for the same problems, and feels she is feeling worse now and states "little was done for me there. Fidelina states she has been caring for her older brother, and is under a lot of stress, as she is trying to find a place for him to live. She states her pcp is Ale Handy MD , with [facility], and I asked if she has followed up with Dr Mcdaniel since her last ED visit.  She states she did speak with her, and Dr Mcdaniel put in referral to Dr Mani Trent, cardiology.  Fidelina has consult appt with Dr Trent 11/01/18 but has not seen him yet in any capacity.  Message to Dr Mcdaniel and Dr Trent.  Please contact caller directly with any additional care advice.     11/5/18 [facility]  Reports normal [facility] 2 years ago  Has had 2 ER visits for CP and SOB in the last 2 weeks  EKG NSR TWI inferiorly and anteriorly     Stress echo 11/12/18  · Abnormal exercise stress echo. Mild basal inferior hypokinesis post-stress.  · Left ventricle ejection fraction is normal at 60%  · No wall motion abnormalities at rest.  · Left ventricle shows concentric hypertrophy.  · The EKG portion of this study is positive for myocardial ischemia. (1-2mm ST depression in the inferolateral leads, Roby 7:22, 9 METS, 94% MPHR)  · The stress echo portion of this study is positive for myocardial ischemia.     She " reports that CP and SOB have improved  I discussed her abnormal stress findings. Given her improved symptoms she would like to hold off on a repeat LHC. Will add prn NTG and ASA 81 qd. Would have a low threshold for LHC if symptoms recur     3/7/19 Denies any significant CP  Gets some pain between her shoulder blades but this goes away with a heating pad  Has not needed NTG  EKG NSR - minor NSSTT changes   She wishes to continue to observe symptoms - if they progress would recommend LHC  OV 6 months     Called triage nurse 6/2/20  Spoke with patient she states that she had severe chest pain with tightness earlier.  She denies that she is having any chest pain at this time. Patient states that she is having irregular heart beats.  Advised patient to go to ER and have another drive.  Patient verbalized understanding.       6/12/20 Still with intermittent CP  EKG NSR NSSTT changes

## 2020-06-30 ENCOUNTER — NURSE TRIAGE (OUTPATIENT)
Dept: ADMINISTRATIVE | Facility: CLINIC | Age: 60
End: 2020-06-30

## 2020-07-13 ENCOUNTER — OFFICE VISIT (OUTPATIENT)
Dept: CARDIOLOGY | Facility: CLINIC | Age: 60
End: 2020-07-13
Payer: COMMERCIAL

## 2020-07-13 VITALS
BODY MASS INDEX: 30.47 KG/M2 | HEART RATE: 69 BPM | WEIGHT: 189.63 LBS | DIASTOLIC BLOOD PRESSURE: 76 MMHG | SYSTOLIC BLOOD PRESSURE: 135 MMHG | HEIGHT: 66 IN

## 2020-07-13 DIAGNOSIS — R07.89 CHEST PAIN, ATYPICAL: Primary | ICD-10-CM

## 2020-07-13 DIAGNOSIS — E78.2 MIXED HYPERLIPIDEMIA: ICD-10-CM

## 2020-07-13 DIAGNOSIS — I10 ESSENTIAL HYPERTENSION: ICD-10-CM

## 2020-07-13 PROCEDURE — 99999 PR PBB SHADOW E&M-EST. PATIENT-LVL III: CPT | Mod: PBBFAC,,, | Performed by: INTERNAL MEDICINE

## 2020-07-13 PROCEDURE — 99213 OFFICE O/P EST LOW 20 MIN: CPT | Mod: S$GLB,,, | Performed by: INTERNAL MEDICINE

## 2020-07-13 PROCEDURE — 99999 PR PBB SHADOW E&M-EST. PATIENT-LVL III: ICD-10-PCS | Mod: PBBFAC,,, | Performed by: INTERNAL MEDICINE

## 2020-07-13 PROCEDURE — 99213 PR OFFICE/OUTPT VISIT, EST, LEVL III, 20-29 MIN: ICD-10-PCS | Mod: S$GLB,,, | Performed by: INTERNAL MEDICINE

## 2020-07-13 NOTE — PROGRESS NOTES
"Subjective:    Patient ID:  Fidelina Schuler is a 60 y.o. female who presents for follow-up of Post-op Evaluation      HPI     6/17/20 [facility] EDP 12, Normal coronaries    Referred by Dr Mcdaniel     Called triage nurse 10/24/18  Protocols used: ST BREATHING DIFFICULTY-A-OH     Fidelina called for appt this afternoon, and  sent her call to triage line due to her symptoms.  She is reporting SOB, with a feeling that "I can't get enough air.  I feel like I have to take a very deep breath to get enough air, and it is not enough. I am also ligththeaded and feel like I am going to pass out when I stand up.  I look in the mirror and I look very pale to me."  Self-administered two albuterol inhaler treatments today, but states they did not help. She went to [facility] ED two weeks ago for the same problems, and feels she is feeling worse now and states "little was done for me there. Fidelina states she has been caring for her older brother, and is under a lot of stress, as she is trying to find a place for him to live. She states her pcp is Ale Handy MD , with [facility], and I asked if she has followed up with Dr Mcdaniel since her last ED visit.  She states she did speak with her, and Dr Mcdaniel put in referral to Dr Mani Trent, cardiology.  Fidelina has consult appt with Dr Trent 11/01/18 but has not seen him yet in any capacity.  Message to Dr Mcdaniel and Dr Trent.  Please contact caller directly with any additional care advice.     11/5/18 [facility]  Reports normal [facility] 2 years ago  Has had 2 ER visits for CP and SOB in the last 2 weeks  EKG NSR TWI inferiorly and anteriorly    6/17/20 [facility] EDP 12, Normal coronaries     Stress echo 11/12/18  · Abnormal exercise stress echo. Mild basal inferior hypokinesis post-stress.  · Left ventricle ejection fraction is normal at 60%  · No wall motion abnormalities at rest.  · Left ventricle shows concentric hypertrophy.  · The EKG portion of this " study is positive for myocardial ischemia. (1-2mm ST depression in the inferolateral leads, Roby 7:22, 9 METS, 94% MPHR)  · The stress echo portion of this study is positive for myocardial ischemia.     She reports that CP and SOB have improved  I discussed her abnormal stress findings. Given her improved symptoms she would like to hold off on a repeat LHC. Will add prn NTG and ASA 81 qd. Would have a low threshold for LHC if symptoms recur     3/7/19 Denies any significant CP  Gets some pain between her shoulder blades but this goes away with a heating pad  Has not needed NTG  EKG NSR - minor NSSTT changes   She wishes to continue to observe symptoms - if they progress would recommend LHC  OV 6 months     Called triage nurse 6/2/20  Spoke with patient she states that she had severe chest pain with tightness earlier.  She denies that she is having any chest pain at this time. Patient states that she is having irregular heart beats.  Advised patient to go to ER and have another drive.  Patient verbalized understanding.       6/12/20 Still with intermittent CP  EKG NSR NSSTT changes     7/13/20 Did well after LHC  Denies CP or SOB  Occasional palpitations    Review of Systems   Constitution: Negative for decreased appetite.   HENT: Negative for ear discharge.    Eyes: Negative for blurred vision.   Respiratory: Negative for hemoptysis.    Endocrine: Negative for polyphagia.   Hematologic/Lymphatic: Negative for adenopathy.   Skin: Negative for color change.   Musculoskeletal: Negative for joint swelling.   Genitourinary: Negative for bladder incontinence.   Neurological: Negative for brief paralysis.   Psychiatric/Behavioral: Negative for hallucinations.   Allergic/Immunologic: Negative for hives.        Objective:    Physical Exam   Constitutional: She is oriented to person, place, and time. She appears well-developed and well-nourished.   HENT:   Head: Normocephalic and atraumatic.   Eyes: Pupils are equal, round,  and reactive to light. Conjunctivae are normal.   Neck: Normal range of motion. Neck supple.   Cardiovascular: Normal rate, normal heart sounds and intact distal pulses.   Pulmonary/Chest: Effort normal and breath sounds normal.   Abdominal: Soft. Bowel sounds are normal.   Musculoskeletal: Normal range of motion.   Neurological: She is alert and oriented to person, place, and time.   Skin: Skin is warm and dry.         Assessment:       1. Chest pain, atypical    2. Essential hypertension    3. Mixed hyperlipidemia         Plan:       Cardiac stable  OV 6 months  If palpitations worsen consider an event monitor

## 2021-04-15 ENCOUNTER — PATIENT MESSAGE (OUTPATIENT)
Dept: RESEARCH | Facility: HOSPITAL | Age: 61
End: 2021-04-15

## 2022-12-19 ENCOUNTER — HOSPITAL ENCOUNTER (EMERGENCY)
Facility: HOSPITAL | Age: 62
Discharge: HOME | End: 2022-12-19
Attending: EMERGENCY MEDICINE
Payer: COMMERCIAL

## 2022-12-19 VITALS
DIASTOLIC BLOOD PRESSURE: 74 MMHG | HEART RATE: 58 BPM | RESPIRATION RATE: 17 BRPM | OXYGEN SATURATION: 98 % | SYSTOLIC BLOOD PRESSURE: 117 MMHG | BODY MASS INDEX: 33.32 KG/M2 | WEIGHT: 200 LBS | TEMPERATURE: 98 F | HEIGHT: 65 IN

## 2022-12-19 DIAGNOSIS — K57.92 ACUTE DIVERTICULITIS: Primary | ICD-10-CM

## 2022-12-19 LAB
ALBUMIN SERPL-MCNC: 4 G/DL (ref 3.3–5.5)
ALBUMIN SERPL-MCNC: 4.1 G/DL (ref 3.3–5.5)
ALP SERPL-CCNC: 57 U/L (ref 42–141)
ALP SERPL-CCNC: 62 U/L (ref 42–141)
BILIRUB SERPL-MCNC: 0.6 MG/DL (ref 0.2–1.6)
BILIRUB SERPL-MCNC: 0.6 MG/DL (ref 0.2–1.6)
BILIRUBIN, POC UA: NEGATIVE
BLOOD, POC UA: ABNORMAL
BUN SERPL-MCNC: 15 MG/DL (ref 7–22)
CALCIUM SERPL-MCNC: 9.3 MG/DL (ref 8–10.3)
CHLORIDE SERPL-SCNC: 106 MMOL/L (ref 98–108)
CLARITY, POC UA: CLEAR
COLOR, POC UA: YELLOW
CREAT SERPL-MCNC: 0.9 MG/DL (ref 0.6–1.2)
CTP QC/QA: YES
GLUCOSE SERPL-MCNC: 87 MG/DL (ref 73–118)
GLUCOSE, POC UA: NEGATIVE
INFLUENZA A ANTIGEN, POC: NEGATIVE
INFLUENZA B ANTIGEN, POC: NEGATIVE
KETONES, POC UA: ABNORMAL
LEUKOCYTE EST, POC UA: NEGATIVE
NITRITE, POC UA: NEGATIVE
PH UR STRIP: 5.5 [PH]
POC ALT (SGPT): 42 U/L (ref 10–47)
POC ALT (SGPT): 47 U/L (ref 10–47)
POC AMYLASE: 52 U/L (ref 14–97)
POC AST (SGOT): 33 U/L (ref 11–38)
POC AST (SGOT): 34 U/L (ref 11–38)
POC GGT: 59 U/L (ref 5–65)
POC RAPID STREP A: NEGATIVE
POC TCO2: 22 MMOL/L (ref 18–33)
POTASSIUM BLD-SCNC: 3.6 MMOL/L (ref 3.6–5.1)
PROTEIN, POC UA: ABNORMAL
PROTEIN, POC: 6.6 G/DL (ref 6.4–8.1)
PROTEIN, POC: 6.7 G/DL (ref 6.4–8.1)
SARS-COV-2 RDRP RESP QL NAA+PROBE: NEGATIVE
SODIUM BLD-SCNC: 140 MMOL/L (ref 128–145)
SPECIFIC GRAVITY, POC UA: 1.02
UROBILINOGEN, POC UA: 0.2 E.U./DL

## 2022-12-19 PROCEDURE — 85025 COMPLETE CBC W/AUTO DIFF WBC: CPT | Mod: ER

## 2022-12-19 PROCEDURE — 87804 INFLUENZA ASSAY W/OPTIC: CPT | Mod: ER

## 2022-12-19 PROCEDURE — 25500020 PHARM REV CODE 255: Mod: ER | Performed by: EMERGENCY MEDICINE

## 2022-12-19 PROCEDURE — 82962 GLUCOSE BLOOD TEST: CPT | Mod: ER

## 2022-12-19 PROCEDURE — 99285 EMERGENCY DEPT VISIT HI MDM: CPT | Mod: 25,ER

## 2022-12-19 PROCEDURE — 80053 COMPREHEN METABOLIC PANEL: CPT | Mod: ER

## 2022-12-19 PROCEDURE — 81003 URINALYSIS AUTO W/O SCOPE: CPT | Mod: ER

## 2022-12-19 PROCEDURE — 82150 ASSAY OF AMYLASE: CPT | Mod: ER

## 2022-12-19 PROCEDURE — 96374 THER/PROPH/DIAG INJ IV PUSH: CPT | Mod: ER

## 2022-12-19 PROCEDURE — 96361 HYDRATE IV INFUSION ADD-ON: CPT | Mod: ER

## 2022-12-19 PROCEDURE — 87635 SARS-COV-2 COVID-19 AMP PRB: CPT | Mod: ER | Performed by: EMERGENCY MEDICINE

## 2022-12-19 PROCEDURE — 25000003 PHARM REV CODE 250: Mod: ER | Performed by: EMERGENCY MEDICINE

## 2022-12-19 PROCEDURE — 63600175 PHARM REV CODE 636 W HCPCS: Mod: ER | Performed by: EMERGENCY MEDICINE

## 2022-12-19 RX ORDER — FAMOTIDINE 20 MG/1
20 TABLET, FILM COATED ORAL 2 TIMES DAILY
Qty: 20 TABLET | Refills: 0 | Status: SHIPPED | OUTPATIENT
Start: 2022-12-19 | End: 2024-02-26

## 2022-12-19 RX ORDER — ACETAMINOPHEN 500 MG
1000 TABLET ORAL EVERY 6 HOURS PRN
Qty: 30 TABLET | Refills: 0 | Status: SHIPPED | OUTPATIENT
Start: 2022-12-19

## 2022-12-19 RX ORDER — ONDANSETRON 2 MG/ML
8 INJECTION INTRAMUSCULAR; INTRAVENOUS
Status: COMPLETED | OUTPATIENT
Start: 2022-12-19 | End: 2022-12-19

## 2022-12-19 RX ORDER — AMOXICILLIN AND CLAVULANATE POTASSIUM 875; 125 MG/1; MG/1
1 TABLET, FILM COATED ORAL 2 TIMES DAILY
Qty: 20 TABLET | Refills: 0 | Status: SHIPPED | OUTPATIENT
Start: 2022-12-19 | End: 2022-12-29

## 2022-12-19 RX ORDER — PROMETHAZINE HYDROCHLORIDE 25 MG/1
25 SUPPOSITORY RECTAL EVERY 6 HOURS PRN
Qty: 10 SUPPOSITORY | Refills: 0 | Status: SHIPPED | OUTPATIENT
Start: 2022-12-19 | End: 2024-02-26

## 2022-12-19 RX ORDER — ONDANSETRON 8 MG/1
8 TABLET, ORALLY DISINTEGRATING ORAL EVERY 6 HOURS PRN
Qty: 20 TABLET | Refills: 0 | Status: SHIPPED | OUTPATIENT
Start: 2022-12-19 | End: 2022-12-21

## 2022-12-19 RX ORDER — DICYCLOMINE HYDROCHLORIDE 20 MG/1
20 TABLET ORAL 3 TIMES DAILY PRN
Qty: 30 TABLET | Refills: 0 | Status: SHIPPED | OUTPATIENT
Start: 2022-12-19 | End: 2023-01-18

## 2022-12-19 RX ADMIN — ONDANSETRON 8 MG: 2 INJECTION INTRAMUSCULAR; INTRAVENOUS at 04:12

## 2022-12-19 RX ADMIN — IOHEXOL 100 ML: 350 INJECTION, SOLUTION INTRAVENOUS at 04:12

## 2022-12-19 RX ADMIN — SODIUM CHLORIDE 1000 ML: 0.9 INJECTION, SOLUTION INTRAVENOUS at 04:12

## 2022-12-19 NOTE — Clinical Note
"Fidelina "Fidelina" Ganga was seen and treated in our emergency department on 12/19/2022.  She may return to work on 12/22/2022.       If you have any questions or concerns, please don't hesitate to call.      Zenia Warner, DO"

## 2022-12-19 NOTE — ED PROVIDER NOTES
"Encounter Date: 12/19/2022    SCRIBE #1 NOTE: I, Beatris Morfin, am scribing for, and in the presence of,  Zenia Warner DO. I have scribed the following portions of the note - Other sections scribed: HPI, ROS, PE.     History     Chief Complaint   Patient presents with    Diarrhea    Abdominal Pain     Pt c/o fever, abdominal pain, and diarrhea for one week     62 y.o. female with PMHx of HLD, HTN, Hashimoto's disease, Fatty liver disease, Diverticulosis, and others who presents to the ED for chief complaint of diarrhea not improving for 1 week. Patient describes liquid diarrhea that became more solid over the last few days, but now appears to be more "milky white". These symptoms began a few days after eating oysters. She endorses an associated fever (Tmax 102) that is resolved with intermittent nausea and left lower abdominal pain. 3 days ago, she noted periumbilical abdominal pain that radiated to the right lower quadrant; this is now resolved. She denies hematochezia or worsening rectal bleeding (chronic internal hemorrhoid). No recent antibiotic use. PSHx of Cholecystectomy.     The history is provided by the patient. No  was used.   Review of patient's allergies indicates:   Allergen Reactions    Amlodipine Other (See Comments)    Aspirin     Codeine Other (See Comments)    Mobic [meloxicam] Other (See Comments)     Swelling in both legs    Phenytoin sodium extended Nausea And Vomiting     Past Medical History:   Diagnosis Date    DDD (degenerative disc disease), lumbosacral     Depression     Fatty liver     Fibromyalgia     Hyperlipidemia     Hypertension     Hypothyroidism     hashimoto's     Internal hemorrhoids     Lichen sclerosus     Liver cyst     Urinary bladder neurogenic dysfunction     Vertigo      Past Surgical History:   Procedure Laterality Date    CARDIAC CATHETERIZATION  1/2012    Dr. Ansari    CHOLECYSTECTOMY      HYSTERECTOMY      LEFT HEART CATHETERIZATION Left 6/17/2020    " Procedure: Left heart cath;  Surgeon: Mani Trent MD;  Location: [facility] CATH LAB;  Service: Cardiology;  Laterality: Left;  RN Pre Op--- COVID NEGATIVE-- 6-. CA    TONSILLECTOMY, ADENOIDECTOMY      TUBAL LIGATION       Family History   Problem Relation Age of Onset    Asthma Mother     Heart disease Mother     Emphysema Mother     Glaucoma Mother     Heart disease Father     Cancer Father         colon    Macular degeneration Maternal Uncle     No Known Problems Sister     No Known Problems Brother     No Known Problems Maternal Aunt     No Known Problems Paternal Aunt     No Known Problems Paternal Uncle     No Known Problems Maternal Grandmother     No Known Problems Maternal Grandfather     No Known Problems Paternal Grandmother     No Known Problems Paternal Grandfather     Amblyopia Neg Hx     Blindness Neg Hx     Cataracts Neg Hx     Diabetes Neg Hx     Hypertension Neg Hx     Retinal detachment Neg Hx     Strabismus Neg Hx     Stroke Neg Hx     Thyroid disease Neg Hx      Social History     Tobacco Use    Smoking status: Former     Packs/day: 0.50     Years: 15.00     Pack years: 7.50     Types: Cigarettes     Quit date: 2013     Years since quittin.2    Smokeless tobacco: Never   Substance Use Topics    Alcohol use: Yes     Comment: socially    Drug use: No     Review of Systems   Constitutional:  Negative for fever.   HENT:  Negative for rhinorrhea and sore throat.    Eyes:  Negative for redness.   Respiratory:  Negative for shortness of breath.    Cardiovascular:  Negative for chest pain and leg swelling.   Gastrointestinal:  Positive for abdominal pain, diarrhea and nausea. Negative for anal bleeding, blood in stool and vomiting.   Musculoskeletal:  Negative for back pain.   Skin:  Negative for rash.   Neurological:  Negative for syncope and headaches.   All other systems reviewed and are negative.    Physical Exam     Initial Vitals [22 1432]   BP Pulse Resp Temp SpO2   (!)  142/105 61 16 98.1 °F (36.7 °C) 98 %      MAP       --         Patient gave consent to have physical exam performed.  Physical Exam    Nursing note and vitals reviewed.  Constitutional: She appears well-developed and well-nourished.   HENT:   Head: Normocephalic and atraumatic.   Right Ear: External ear normal.   Left Ear: External ear normal.   Nose: Nose normal.   Mouth/Throat: Oropharynx is clear and moist.   Eyes: Conjunctivae and EOM are normal. Pupils are equal, round, and reactive to light.   Neck: Phonation normal. Neck supple.   Normal range of motion.  Cardiovascular:  Normal rate, regular rhythm, normal heart sounds and intact distal pulses.     Exam reveals no gallop and no friction rub.       No murmur heard.  Pulmonary/Chest: Effort normal and breath sounds normal. No stridor. No respiratory distress. She has no wheezes. She has no rhonchi. She has no rales. She exhibits no tenderness.   Abdominal: Abdomen is soft. Bowel sounds are normal. She exhibits no distension. There is abdominal tenderness in the left lower quadrant. There is no rigidity, no rebound and no guarding.   Musculoskeletal:         General: No tenderness or edema. Normal range of motion.      Cervical back: Normal range of motion and neck supple.     Neurological: She is alert and oriented to person, place, and time. She has normal strength. No cranial nerve deficit or sensory deficit. GCS score is 15. GCS eye subscore is 4. GCS verbal subscore is 5. GCS motor subscore is 6.   Skin: Skin is warm and dry. Capillary refill takes less than 2 seconds. No rash noted.   Psychiatric: She has a normal mood and affect. Her behavior is normal.       ED Course   Procedures  Labs Reviewed   POCT URINALYSIS W/O SCOPE - Abnormal; Notable for the following components:       Result Value    Ketones, UA Trace (*)     Blood, UA Trace-intact (*)     Protein, UA Trace (*)     All other components within normal limits   POCT CBC   POCT URINALYSIS W/O  SCOPE   SARS-COV-2 RDRP GENE    Narrative:     This test utilizes isothermal nucleic acid amplification technology to detect the SARS-CoV-2 RdRp nucleic acid segment. The analytical sensitivity (limit of detection) is 500 copies/swab.     A POSITIVE result is indicative of the presence of SARS-CoV-2 RNA; clinical correlation with patient history and other diagnostic information is necessary to determine patient infection status.    A NEGATIVE result means that SARS-CoV-2 nucleic acids are not present above the limit of detection. A NEGATIVE result should be treated as presumptive. It does not rule out the possibility of COVID-19 and should not be the sole basis for treatment decisions. If COVID-19 is strongly suspected based on clinical and exposure history, re-testing using an alternate molecular assay should be considered.     This test is only for use under the Food and Drug Administration s Emergency Use Authorization (EUA).     Commercial kits are provided by [employer]. Performance characteristics of the EUA have been independently verified by [facility] Department of Pathology and Laboratory Medicine.   _________________________________________________________________   The authorized Fact Sheet for Healthcare Providers and the authorized Fact Sheet for Patients of the ID NOW COVID-19 are available on the FDA website:    https://www.fda.gov/media/644755/download      https://www.fda.gov/media/749267/download      POCT INFLUENZA A/B MOLECULAR   POCT STREP A MOLECULAR   POCT CMP   POCT LIVER PANEL   POCT STREP A, RAPID   POCT RAPID INFLUENZA A/B   POCT LIVER PANEL   POCT CMP            Imaging Results              CT Abdomen Pelvis With Contrast (Final result)  Result time 12/19/22 17:45:59      Final result by Juan Kraus MD (12/19/22 17:45:59)                   Impression:      1. Few borderline enlarged mesenteric lymph nodes centrally and on the left with minimal haziness/edema to  the adjacent mesentery.  See above comments.  Mild mesenteric adenitis is a consideration.  This is similar to the prior study.  2. Diverticulosis sigmoid colon with nondistention and possible minimal wall thickening.  Minimal acute diverticulitis of the sigmoid colon is a consideration.  3. Small hiatal hernia.  4. Mild hepatic steatosis.      Electronically signed by: Juan Oleary  Date:    12/19/2022  Time:    17:45               Narrative:    EXAMINATION:  CT ABDOMEN PELVIS WITH CONTRAST    CLINICAL HISTORY:  LLQ abdominal pain;RLQ abdominal pain (Age >= 14y);    TECHNIQUE:  Low dose axial images, sagittal and coronal reformations were obtained from the lung bases to the pubic symphysis following the IV administration of 100 mL of Omnipaque 350 .  Oral contrast was not administered.    COMPARISON:  11/18/2009    FINDINGS:  Abdomen:    - Lower thorax:Small hiatal hernia.    - Lung bases: No infiltrates and no nodules.    - Liver: No focal mass.  Mild diffuse fatty infiltration.    - Gallbladder: Status post cholecystectomy.    - Bile Ducts: No evidence of intra or extra hepatic biliary ductal dilation.    - Spleen: Negative.    - Kidneys: No stone, mass or hydronephrosis bilaterally.    - Adrenals: Unremarkable.    - Pancreas: No mass or peripancreatic fat stranding.    - Retroperitoneum:  No significant adenopathy.    - Vascular: No abdominal aortic aneurysm.    - Abdominal wall:  Unremarkable.    Pelvis:    Urinary bladder is incompletely distended.  No focal abnormality.    Status post hysterectomy.    The appendix is within normal limits.    Bowel/Mesentery:    No evidence of bowel obstruction.  There is diverticulosis of the sigmoid colon.  There is nondistention and possible minimal wall thickening in the sigmoid colon.  Lack of bowel distension may diminish characterization.  Minimal acute diverticulitis of the sigmoid colon is a consideration.    Few borderline enlarged mesenteric lymph nodes centrally  and on the left with minimal haziness to the adjacent mesentery.  1 cm lymph nodes are noted on axial 34 and axial 30.  Mild mesenteric adenitis is a consideration.  This is similar to the prior study.    Bones:  No acute osseous abnormality and no suspicious lytic or blastic lesion.                                       Medications   sodium chloride 0.9% bolus 1,000 mL 1,000 mL (1,000 mLs Intravenous New Bag 12/19/22 1636)   ondansetron injection 8 mg (8 mg Intravenous Given 12/19/22 1636)   iohexoL (OMNIPAQUE 350) injection 100 mL (100 mLs Intravenous Given 12/19/22 1638)     Medical Decision Making:   History:   Old Medical Records: I decided to obtain old medical records.  Clinical Tests:   Lab Tests: Ordered and Reviewed  Radiological Study: Ordered and Reviewed     This is an evaluation of a 62 y.o. female that presents to the Emergency Department for   Chief Complaint   Patient presents with    Diarrhea    Abdominal Pain     Pt c/o fever, abdominal pain, and diarrhea for one week       The patient is a non-toxic and well appearing patient. On physical exam, patient appears well hydrated with moist mucus membranes. Breath sounds are clear and equal bilaterally with no adventitious breath sounds, tachypnea or respiratory distress. Regular rate and rhythm. No murmurs. Abdomen soft and left lower quadrant tenderness.  No guarding rebound or rigidity.  Patient is tolerating PO without difficulty.    Differential diagnosis: Gastroenteritis, Diverticulosis, Diverticulitis, UTI, Viral illness, Influenza, COVID, Strep    Vital Signs Are Reassuring. Evaluation included COVID negative, flu negative, strep negative.  BUN of 15, creatinine 0.9, and potassium 3.6.  UA negative for leukocytes and nitrates.  CT scan concerning for possible acute diverticulitis.   I considered obtaining stool studies.  Patient unable to have a bowel movement in the emergency department so no stool studies were ordered.    ED Course:Treatment  in the ED included Physical Exam and   Medications   sodium chloride 0.9% bolus 1,000 mL 1,000 mL (1,000 mLs Intravenous New Bag 12/19/22 1636)   ondansetron injection 8 mg (8 mg Intravenous Given 12/19/22 1636)   iohexoL (OMNIPAQUE 350) injection 100 mL (100 mLs Intravenous Given 12/19/22 1638)   .   Patient reports feeling better after treatment in the ER.   Discussed treatment, prescriptions, labs, and imaging results with the patient.    Discharge home with   ED Prescriptions       Medication Sig Dispense Start Date End Date Auth. Provider    ondansetron (ZOFRAN-ODT) 8 MG TbDL Take 1 tablet (8 mg total) by mouth every 6 (six) hours as needed (As needed for nausea). 20 tablet 12/19/2022 12/21/2022 Zenia Warner, DO    famotidine (PEPCID) 20 MG tablet Take 1 tablet (20 mg total) by mouth 2 (two) times daily. 20 tablet 12/19/2022 12/19/2023 Zenia Warner DO    promethazine (PHENERGAN) 25 MG suppository Place 1 suppository (25 mg total) rectally every 6 (six) hours as needed (Use if  nausea not controlled with oral medication). 10 suppository 12/19/2022 -- Zenia Warner, DO    acetaminophen (TYLENOL) 500 MG tablet Take 2 tablets (1,000 mg total) by mouth every 6 (six) hours as needed for Pain. 30 tablet 12/19/2022 -- Zenia Warner DO    amoxicillin-clavulanate 875-125mg (AUGMENTIN) 875-125 mg per tablet Take 1 tablet by mouth 2 (two) times daily. for 10 days 20 tablet 12/19/2022 12/29/2022 Zenia Warner DO    dicyclomine (BENTYL) 20 mg tablet Take 1 tablet (20 mg total) by mouth 3 (three) times daily as needed (As needed for abdominal pain and cramps). 30 tablet 12/19/2022 1/18/2023 Zenia Warner DO          Fill and take prescriptions as directed.  Return to the ED if symptoms worsen or do not resolve.   Answered questions and discussed discharge plan.    Patient feels better and is ready for discharge.  Follow up with PCP/specialist in 1 day     Scribe Attestation:   Scribe #1: I performed the above scribed service and  the documentation accurately describes the services I performed. I attest to the accuracy of the note.                  I, Dr. Zenia Warner, personally performed the services described in this documentation. This document was produced by a scribe under my direction and in my presence. All medical record entries made by the scribe were at my direction and in my presence.  I have reviewed the chart and agree that the record reflects my personal performance and is accurate and complete. Zenia Warner DO.     12/19/2022 6:35 PM    Clinical Impression:   Final diagnoses:  [K57.92] Acute diverticulitis (Primary)        ED Disposition Condition    Discharge Stable          ED Prescriptions       Medication Sig Dispense Start Date End Date Auth. Provider    ondansetron (ZOFRAN-ODT) 8 MG TbDL Take 1 tablet (8 mg total) by mouth every 6 (six) hours as needed (As needed for nausea). 20 tablet 12/19/2022 12/21/2022 Zenia Warner DO    famotidine (PEPCID) 20 MG tablet Take 1 tablet (20 mg total) by mouth 2 (two) times daily. 20 tablet 12/19/2022 12/19/2023 Zenia Warner DO    promethazine (PHENERGAN) 25 MG suppository Place 1 suppository (25 mg total) rectally every 6 (six) hours as needed (Use if  nausea not controlled with oral medication). 10 suppository 12/19/2022 -- Zenia Warner DO    acetaminophen (TYLENOL) 500 MG tablet Take 2 tablets (1,000 mg total) by mouth every 6 (six) hours as needed for Pain. 30 tablet 12/19/2022 -- Zenia Warner DO    amoxicillin-clavulanate 875-125mg (AUGMENTIN) 875-125 mg per tablet Take 1 tablet by mouth 2 (two) times daily. for 10 days 20 tablet 12/19/2022 12/29/2022 Zenia Warner DO    dicyclomine (BENTYL) 20 mg tablet Take 1 tablet (20 mg total) by mouth 3 (three) times daily as needed (As needed for abdominal pain and cramps). 30 tablet 12/19/2022 1/18/2023 Zenia Warner DO          Follow-up Information       Follow up With Specialties Details Why Contact Info Additional Information     Ale Handy MD Internal Medicine Schedule an appointment as soon as possible for a visit in 1 day  [address]  [address]  [facility] [address]  [phone]       [facility] [place] Gastro[facility]ology Gastroenterology  Please follow-up with gastroenterology [address]  [facility] [address] [phone] Please park in garage or surface lot and use Medical Office Bldg entrance. Check in at [address]             Zenia Warner DO  12/19/22 [id number]

## 2022-12-20 NOTE — DISCHARGE INSTRUCTIONS
Please follow-up with your primary care physician for further evaluation of the following abnormalities appreciated on CT scan:  1. Few borderline enlarged mesenteric lymph nodes   2. Acute diverticulitis of the sigmoid colon.   3. Small hiatal hernia.   4. Mild hepatic steatosis.

## 2023-06-08 ENCOUNTER — HOSPITAL ENCOUNTER (EMERGENCY)
Facility: HOSPITAL | Age: 63
Discharge: HOME | End: 2023-06-08
Attending: EMERGENCY MEDICINE
Payer: COMMERCIAL

## 2023-06-08 VITALS
WEIGHT: 210 LBS | DIASTOLIC BLOOD PRESSURE: 74 MMHG | RESPIRATION RATE: 21 BRPM | SYSTOLIC BLOOD PRESSURE: 134 MMHG | BODY MASS INDEX: 34.99 KG/M2 | HEART RATE: 59 BPM | TEMPERATURE: 99 F | OXYGEN SATURATION: 95 % | HEIGHT: 65 IN

## 2023-06-08 DIAGNOSIS — R00.2 PALPITATIONS: Primary | ICD-10-CM

## 2023-06-08 DIAGNOSIS — R53.83 FATIGUE, UNSPECIFIED TYPE: ICD-10-CM

## 2023-06-08 DIAGNOSIS — R07.9 CHEST PAIN: ICD-10-CM

## 2023-06-08 LAB
ALBUMIN SERPL-MCNC: 3.5 G/DL (ref 3.3–5.5)
ALP SERPL-CCNC: 52 U/L (ref 42–141)
BILIRUB SERPL-MCNC: 0.5 MG/DL (ref 0.2–1.6)
BUN SERPL-MCNC: 14 MG/DL (ref 7–22)
CALCIUM SERPL-MCNC: 9.2 MG/DL (ref 8–10.3)
CHLORIDE SERPL-SCNC: 107 MMOL/L (ref 98–108)
CREAT SERPL-MCNC: 0.7 MG/DL (ref 0.6–1.2)
GLUCOSE SERPL-MCNC: 119 MG/DL (ref 73–118)
POC ALT (SGPT): 30 U/L (ref 10–47)
POC AST (SGOT): 27 U/L (ref 11–38)
POC CARDIAC TROPONIN I: 0 NG/ML (ref 0–0.08)
POC D-DI: <100 NG/ML (ref 0–450)
POC PTINR: 1.1 (ref 0.9–1.2)
POC PTWBT: 13 SEC (ref 9.7–14.3)
POC TCO2: 25 MMOL/L (ref 18–33)
POCT GLUCOSE: 127 MG/DL (ref 70–110)
POTASSIUM BLD-SCNC: 4.1 MMOL/L (ref 3.6–5.1)
PROTEIN, POC: 6.3 G/DL (ref 6.4–8.1)
SAMPLE: NORMAL
SAMPLE: NORMAL
SODIUM BLD-SCNC: 139 MMOL/L (ref 128–145)

## 2023-06-08 PROCEDURE — 84484 ASSAY OF TROPONIN QUANT: CPT | Mod: ER

## 2023-06-08 PROCEDURE — 93010 ELECTROCARDIOGRAM REPORT: CPT | Mod: ,,, | Performed by: INTERNAL MEDICINE

## 2023-06-08 PROCEDURE — 80053 COMPREHEN METABOLIC PANEL: CPT | Mod: ER

## 2023-06-08 PROCEDURE — 99284 EMERGENCY DEPT VISIT MOD MDM: CPT | Mod: 25,ER

## 2023-06-08 PROCEDURE — 93010 EKG 12-LEAD: ICD-10-PCS | Mod: ,,, | Performed by: INTERNAL MEDICINE

## 2023-06-08 PROCEDURE — 85379 FIBRIN DEGRADATION QUANT: CPT | Mod: ER

## 2023-06-08 PROCEDURE — 82962 GLUCOSE BLOOD TEST: CPT | Mod: ER

## 2023-06-08 PROCEDURE — 85025 COMPLETE CBC W/AUTO DIFF WBC: CPT | Mod: ER

## 2023-06-08 PROCEDURE — 93005 ELECTROCARDIOGRAM TRACING: CPT | Mod: ER

## 2023-06-08 PROCEDURE — 85610 PROTHROMBIN TIME: CPT | Mod: ER

## 2023-06-08 NOTE — ED PROVIDER NOTES
Encounter Date: 6/8/2023    SCRIBE #1 NOTE: I, Beatris Morfin, am scribing for, and in the presence of,  Megan Teague MD. I have scribed the following portions of the note - Other sections scribed: HPI, ROS, PE.     History     Chief Complaint   Patient presents with    Palpitations     Patient reports feeling like her heart is racing upon waking this morning.      63 y.o. female with PMHx of HLD, HTN, and Hashimoto's who presents to the ED for chief complaint of palpitations noted upon waking up this morning. She describes the sensation as if her heart is beating hard, racing, and fluttering. She also endorses a headache and shortness of breath upon waking up. Her symptoms, including the palpitations, resolved after drinking water. Presently, she only endorses mild fatigue. Patient reports her BP was 98/53 this morning which is atypical for her as it was running high last week. She denies any new medication changes. Patient says that she has been exercising more and her new job has her moving around in a stuffy/ hot office; she denies any symptoms while at work. Patient denies any nausea, vomiting, diarrhea, chest pain, fever, rash, or dysuria. Denies known sick contacts. Patient is a former smoker. Drug allergy to Codeine.    The history is provided by the patient. No  was used.   Review of patient's allergies indicates:   Allergen Reactions    Amlodipine Other (See Comments)    Aspirin     Codeine Other (See Comments)    Mobic [meloxicam] Other (See Comments)     Swelling in both legs    Phenytoin sodium extended Nausea And Vomiting     Past Medical History:   Diagnosis Date    DDD (degenerative disc disease), lumbosacral     Depression     Fatty liver     Fibromyalgia     Hyperlipidemia     Hypertension     Hypothyroidism     hashimoto's     Internal hemorrhoids     Lichen sclerosus     Liver cyst     Urinary bladder neurogenic dysfunction     Vertigo      Past Surgical History:   Procedure  Laterality Date    CARDIAC CATHETERIZATION  2012    Dr. Ansari    CHOLECYSTECTOMY      HYSTERECTOMY      LEFT HEART CATHETERIZATION Left 2020    Procedure: Left heart cath;  Surgeon: Mani Trent MD;  Location: [facility] CATH LAB;  Service: Cardiology;  Laterality: Left;  RN Pre Op--- COVID NEGATIVE-- 6-. [place]    TONSILLECTOMY, ADENOIDECTOMY      TUBAL LIGATION       Family History   Problem Relation Age of Onset    Asthma Mother     Heart disease Mother     Emphysema Mother     Glaucoma Mother     Heart disease Father     Cancer Father         colon    Macular degeneration Maternal Uncle     No Known Problems Sister     No Known Problems Brother     No Known Problems Maternal Aunt     No Known Problems Paternal Aunt     No Known Problems Paternal Uncle     No Known Problems Maternal Grandmother     No Known Problems Maternal Grandfather     No Known Problems Paternal Grandmother     No Known Problems Paternal Grandfather     Amblyopia Neg Hx     Blindness Neg Hx     Cataracts Neg Hx     Diabetes Neg Hx     Hypertension Neg Hx     Retinal detachment Neg Hx     Strabismus Neg Hx     Stroke Neg Hx     Thyroid disease Neg Hx      Social History     Tobacco Use    Smoking status: Former     Packs/day: 0.50     Years: 15.00     Pack years: 7.50     Types: Cigarettes     Quit date: 2013     Years since quittin.7    Smokeless tobacco: Never   Substance Use Topics    Alcohol use: Yes     Comment: socially    Drug use: No     Review of Systems   Constitutional:  Positive for fatigue. Negative for activity change, appetite change, chills and fever.   HENT:  Negative for congestion, rhinorrhea, sneezing and sore throat.    Respiratory:  Positive for shortness of breath (resolved). Negative for cough, choking and wheezing.    Cardiovascular:  Positive for palpitations (resolved). Negative for chest pain.   Gastrointestinal:  Negative for abdominal pain, diarrhea, nausea and vomiting.   Genitourinary:  Negative  for dysuria.   Skin:  Negative for rash.   Neurological:  Positive for headaches (resolved). Negative for dizziness, syncope and light-headedness.   All other systems reviewed and are negative.    Physical Exam     Initial Vitals [06/08/23 0715]   BP Pulse Resp Temp SpO2   134/89 65 18 98.5 °F (36.9 °C) 97 %      MAP       --         Physical Exam    Nursing note and vitals reviewed.  Constitutional: She appears well-developed and well-nourished. No distress.   HENT:   Head: Normocephalic and atraumatic.   Eyes: Conjunctivae are normal.   Neck:   Normal range of motion.  Cardiovascular:  Normal rate, regular rhythm and normal heart sounds.           No murmur heard.  Pulmonary/Chest: Breath sounds normal. No respiratory distress.   Abdominal: Abdomen is soft. Bowel sounds are normal. She exhibits no distension. There is no abdominal tenderness.   Musculoskeletal:         General: No tenderness or edema. Normal range of motion.      Cervical back: Normal range of motion.     Neurological: She is alert and oriented to person, place, and time. GCS score is 15. GCS eye subscore is 4. GCS verbal subscore is 5. GCS motor subscore is 6.   Skin: Skin is warm and dry.   Psychiatric: She has a normal mood and affect. Her behavior is normal.       ED Course   Procedures  Labs Reviewed   POCT CMP - Abnormal; Notable for the following components:       Result Value    POC Glucose 119 (*)     Protein, POC 6.3 (*)     All other components within normal limits   POCT GLUCOSE - Abnormal; Notable for the following components:    POCT Glucose 127 (*)     All other components within normal limits   TROPONIN ISTAT   POCT CBC   POCT GLUCOSE, HAND-HELD DEVICE   POCT CMP   POCT PROTIME-INR   POCT TROPONIN   POCT D DIMER   ISTAT PROCEDURE   POCT D DIMER       EKG Readings: (Independently Interpreted)   Initial Reading: No STEMI. Rhythm: Normal Sinus Rhythm. Heart Rate: 61. Ectopy: No Ectopy. Conduction: Normal. ST Segments: Normal ST  Segments. T Waves: Normal. Clinical Impression: Normal Sinus Rhythm Other Impression: independently interpreted by me     Imaging Results              X-Ray Chest PA And Lateral (Final result)  Result time 06/08/23 08:05:26      Final result by Rudy Jama MD (06/08/23 08:05:26)                   Impression:      Unremarkable examination.      Electronically signed by: Rudy Jama  Date:    06/08/2023  Time:    08:05               Narrative:    EXAMINATION:  XR CHEST PA AND LATERAL    CLINICAL HISTORY:  Chest Pain;    TECHNIQUE:  PA and lateral views of the chest were performed.    COMPARISON:  Chest radiograph 11/09/2011    FINDINGS:  Lines and tubes: None.    Heart and mediastinum: Unremarkable.    Pleura: No pleural effusion or pneumothorax.  Biapical pleuroparenchymal scarring.    Lungs: Lungs are well inflated. No focal consolidations or evidence of pulmonary edema.    Soft tissue/bone: Unremarkable.                                       Medications - No data to display  Medical Decision Making:   History:   Old Medical Records: I decided to obtain old medical records.  Old Records Summarized: records from clinic visits.       <> Summary of Records: External documents reviewed.  Independently Interpreted Test(s):   I have ordered and independently interpreted EKG Reading(s) - see summary below       <> Summary of EKG Reading(s): EKG independently interpreted by Megan Teague MD reads: See above.   Clinical Tests:   Lab Tests: Reviewed and Ordered  Radiological Study: Ordered and Reviewed  Medical Tests: Reviewed and Ordered       Medical Decision Making  63 y.o. female with PMHx of HLD, HTN, and Hashimoto's who presents to the ED for chief complaint of palpitations noted upon waking up this morning. No symptoms at this time. The patient's physical exam is benign. Her HR and BP are normal. EKG with NSR, no arrhythmia, no acute ischemic changes. Work up shows no anemia, dehydration, electrolyte abnormalities,  evidence of ACS. Will have pt increase fluids, eat at regular intervals, follow up with her PCP for Holter monitor.    I considered but excluded arrhythmia, orthostatic hypotension, dehydration, anemia, pneumothorax, pulmonary edema, acute MI.    Amount and/or Complexity of Data Reviewed  Labs: ordered.  Radiology: ordered.  ECG/medicine tests: ordered and independent interpretation performed. Decision-making details documented in ED Course.         Scribe Attestation:   Scribe #1: I performed the above scribed service and the documentation accurately describes the services I performed. I attest to the accuracy of the note.                 I, Dr. Megan Teague, personally performed the services described in this documentation.   All medical record entries made by the scribe were at my direction and in my presence.   I have reviewed the chart and agree that the record is accurate and complete.   Megan Teague MD.  7:43 AM 06/08/2023   Clinical Impression:   Final diagnoses:  [R07.9] Chest pain  [R00.2] Palpitations (Primary)  [R53.83] Fatigue, unspecified type        ED Disposition Condition    Discharge Stable          ED Prescriptions    None       Follow-up Information       Follow up With Specialties Details Why Contact Info    Ale Handy MD Internal Medicine  As needed [address] [address] [phone]               Megan Teague MD  06/08/23 1137

## 2023-06-08 NOTE — Clinical Note
"Fidelina De Pazce" Ganga was seen and treated in our emergency department on 6/8/2023.  She may return to work on 06/09/2023.       If you have any questions or concerns, please don't hesitate to call.      Megan Teague MD"

## 2023-06-08 NOTE — DISCHARGE INSTRUCTIONS
Get adequate rest. Drink lots of fluids and eat at regular intervals. Check your pulse if you feel the palpitations again. Follow up with your doctor if you continue to have palpitations - she can order a monitor for you to wear at home.

## 2023-09-05 ENCOUNTER — OFFICE VISIT (OUTPATIENT)
Dept: CARDIOLOGY | Facility: CLINIC | Age: 63
End: 2023-09-05
Payer: COMMERCIAL

## 2023-09-05 VITALS
DIASTOLIC BLOOD PRESSURE: 90 MMHG | BODY MASS INDEX: 36.69 KG/M2 | OXYGEN SATURATION: 96 % | SYSTOLIC BLOOD PRESSURE: 136 MMHG | HEART RATE: 73 BPM | WEIGHT: 220.25 LBS | HEIGHT: 65 IN

## 2023-09-05 DIAGNOSIS — R00.2 PALPITATIONS: ICD-10-CM

## 2023-09-05 DIAGNOSIS — R07.89 CHEST PAIN, ATYPICAL: ICD-10-CM

## 2023-09-05 DIAGNOSIS — E78.2 MIXED HYPERLIPIDEMIA: Primary | ICD-10-CM

## 2023-09-05 DIAGNOSIS — I10 PRIMARY HYPERTENSION: ICD-10-CM

## 2023-09-05 PROCEDURE — 3080F DIAST BP >= 90 MM HG: CPT | Mod: CPTII,S$GLB,, | Performed by: INTERNAL MEDICINE

## 2023-09-05 PROCEDURE — 99999 PR PBB SHADOW E&M-EST. PATIENT-LVL IV: CPT | Mod: PBBFAC,,, | Performed by: INTERNAL MEDICINE

## 2023-09-05 PROCEDURE — 99999 PR PBB SHADOW E&M-EST. PATIENT-LVL IV: ICD-10-PCS | Mod: PBBFAC,,, | Performed by: INTERNAL MEDICINE

## 2023-09-05 PROCEDURE — 3008F BODY MASS INDEX DOCD: CPT | Mod: CPTII,S$GLB,, | Performed by: INTERNAL MEDICINE

## 2023-09-05 PROCEDURE — 3008F PR BODY MASS INDEX (BMI) DOCUMENTED: ICD-10-PCS | Mod: CPTII,S$GLB,, | Performed by: INTERNAL MEDICINE

## 2023-09-05 PROCEDURE — 3080F PR MOST RECENT DIASTOLIC BLOOD PRESSURE >= 90 MM HG: ICD-10-PCS | Mod: CPTII,S$GLB,, | Performed by: INTERNAL MEDICINE

## 2023-09-05 PROCEDURE — 99204 PR OFFICE/OUTPT VISIT, NEW, LEVL IV, 45-59 MIN: ICD-10-PCS | Mod: S$GLB,,, | Performed by: INTERNAL MEDICINE

## 2023-09-05 PROCEDURE — 3075F SYST BP GE 130 - 139MM HG: CPT | Mod: CPTII,S$GLB,, | Performed by: INTERNAL MEDICINE

## 2023-09-05 PROCEDURE — 1159F PR MEDICATION LIST DOCUMENTED IN MEDICAL RECORD: ICD-10-PCS | Mod: CPTII,S$GLB,, | Performed by: INTERNAL MEDICINE

## 2023-09-05 PROCEDURE — 3075F PR MOST RECENT SYSTOLIC BLOOD PRESS GE 130-139MM HG: ICD-10-PCS | Mod: CPTII,S$GLB,, | Performed by: INTERNAL MEDICINE

## 2023-09-05 PROCEDURE — 1159F MED LIST DOCD IN RCRD: CPT | Mod: CPTII,S$GLB,, | Performed by: INTERNAL MEDICINE

## 2023-09-05 PROCEDURE — 99204 OFFICE O/P NEW MOD 45 MIN: CPT | Mod: S$GLB,,, | Performed by: INTERNAL MEDICINE

## 2023-09-05 RX ORDER — LEVOTHYROXINE SODIUM 125 UG/1
1 TABLET ORAL DAILY
COMMUNITY
Start: 2023-06-12

## 2023-09-05 RX ORDER — CELECOXIB 200 MG/1
200 CAPSULE ORAL DAILY
COMMUNITY
Start: 2023-07-07

## 2023-09-05 NOTE — PROGRESS NOTES
"Subjective:   Patient ID:  Fidelina Schuler is a 63 y.o. female who presents for evaluation of No chief complaint on file.      HPI    HTN, HLD     Referred by Dr Mcdaniel     Called triage nurse 10/24/18  Protocols used: ST BREATHING DIFFICULTY-A-OH     Fidelina called for appt this afternoon, and  sent her call to triage line due to her symptoms.  She is reporting SOB, with a feeling that "I can't get enough air.  I feel like I have to take a very deep breath to get enough air, and it is not enough. I am also ligththeaded and feel like I am going to pass out when I stand up.  I look in the mirror and I look very pale to me."  Self-administered two albuterol inhaler treatments today, but states they did not help. She went to [facility] ED two weeks ago for the same problems, and feels she is feeling worse now and states "little was done for me there. Fidelina states she has been caring for her older brother, and is under a lot of stress, as she is trying to find a place for him to live. She states her pcp is Ale Handy MD , with [facility], and I asked if she has followed up with Dr Mcdaniel since her last ED visit.  She states she did speak with her, and Dr Mcdaniel put in referral to Dr Mani Trent, cardiology.  Fidelina has consult appt with Dr Trent 11/01/18 but has not seen him yet in any capacity.  Message to Dr Mcdaniel and Dr Trent.  Please contact caller directly with any additional care advice.     11/5/18 [facility]  Reports normal LHC 2 years ago  Has had 2 ER visits for CP and SOB in the last 2 weeks  EKG NSR TWI inferiorly and anteriorly     6/17/20 C - EDP 12, Normal coronaries     Stress echo 11/12/18  Abnormal exercise stress echo. Mild basal inferior hypokinesis post-stress.  Left ventricle ejection fraction is normal at 60%  No wall motion abnormalities at rest.  Left ventricle shows concentric hypertrophy.  The EKG portion of this study is positive for " myocardial ischemia. (1-2mm ST depression in the inferolateral leads, Roby 7:22, 9 METS, 94% MPHR)  The stress echo portion of this study is positive for myocardial ischemia.     She reports that CP and SOB have improved  I discussed her abnormal stress findings. Given her improved symptoms she would like to hold off on a repeat LHC. Will add prn NTG and ASA 81 qd. Would have a low threshold for LHC if symptoms recur     3/7/19 Denies any significant CP  Gets some pain between her shoulder blades but this goes away with a heating pad  Has not needed NTG  EKG NSR - minor NSSTT changes   She wishes to continue to observe symptoms - if they progress would recommend LHC  OV 6 months     Called triage nurse 6/2/20  Spoke with patient she states that she had severe chest pain with tightness earlier.  She denies that she is having any chest pain at this time. Patient states that she is having irregular heart beats.  Advised patient to go to ER and have another drive.  Patient verbalized understanding.       6/12/20 Still with intermittent CP  EKG NSR NSSTT changes     7/13/20 Did well after LHC  Denies CP or SOB  OccCardiac stable  OV 6 months  If palpitations worsen consider an event monitorasional palpitations    Went to the ER 6/8/23  63 y.o. female with PMHx of HLD, HTN, and Hashimoto's who presents to the ED for chief complaint of palpitations noted upon waking up this morning. She describes the sensation as if her heart is beating hard, racing, and fluttering. She also endorses a headache and shortness of breath upon waking up. Her symptoms, including the palpitations, resolved after drinking water. Presently, she only endorses mild fatigue. Patient reports her BP was 98/53 this morning which is atypical for her as it was running high last week. She denies any new medication changes. 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 Patient denies any nausea, vomiting, diarrhea, chest pain, fever, rash, or dysuria. Denies known sick contacts. Patient is a former smoker. Drug allergy to Codeine.    EKG 6/8/23 NSR - ok    9/5/23 Had a recent episode of nausea and abdominal pain while shopping. Denies CP or SOB  Palpitations recently increased but TSH low at recent OV with Dr Sellers  BP controlled      Review of Systems   Constitutional: Negative for decreased appetite.   HENT:  Negative for ear discharge.    Eyes:  Negative for blurred vision.   Respiratory:  Negative for hemoptysis.    Endocrine: Negative for polyphagia.   Hematologic/Lymphatic: Negative for adenopathy.   Skin:  Negative for color change.   Musculoskeletal:  Negative for joint swelling.   Genitourinary:  Negative for bladder incontinence.   Neurological:  Negative for brief paralysis.   Psychiatric/Behavioral:  Negative for hallucinations.    Allergic/Immunologic: Negative for hives.       Objective:   Physical Exam  Constitutional:       Appearance: She is well-developed.   HENT:      Head: Normocephalic and atraumatic.   Eyes:      Conjunctiva/sclera: Conjunctivae normal.      Pupils: Pupils are equal, round, and reactive to light.   Cardiovascular:      Rate and Rhythm: Normal rate.      Pulses: Intact distal pulses.      Heart sounds: Normal heart sounds.   Pulmonary:      Effort: Pulmonary effort is normal.      Breath sounds: Normal breath sounds.   Abdominal:      General: Bowel sounds are normal.      Palpations: Abdomen is soft.   Musculoskeletal:         General: Normal range of motion.      Cervical back: Normal range of motion and neck supple.   Skin:     General: Skin is warm and dry.   Neurological:      Mental Status: She is alert and oriented to person, place, and time.         Assessment:      1. Mixed hyperlipidemia    2. Primary hypertension    3. Chest pain, atypical    4. Palpitations        Plan:     Doubt recent abdominal pain episode was  cardiac. Discussed repeat stress test versus observation - we are both in agreement to observe  Palpitations have worsened  - likely from over replacement of thyroid hormone - discussed holter or event monitor - she would like to wait and see if correcting thyroid will fix problem  Continue Rx for HTN, HLD  OV 3 months

## 2023-11-24 ENCOUNTER — TELEPHONE (OUTPATIENT)
Dept: CARDIOLOGY | Facility: HOSPITAL | Age: 63
End: 2023-11-24
Payer: COMMERCIAL

## 2023-11-24 NOTE — TELEPHONE ENCOUNTER
----- Message from Olga Wasserman MA sent at 11/24/2023 10:35 AM CST -----  Regarding: FW: Requesting advice  Dr Twan Alfredo    I spoke with Mrs. Schuler.  She is not having any chest pain right now.  She states that she took all her medications and gastro medications as well and nothing was taking the chest pain away, except when she took a baby aspirin.  That helped her but she is concern because she is taking Celebrex and cannot take Aspirin while on that.    Please advise      Thank You   ----- Message -----  From: Marsha Bonner  Sent: 11/24/2023  10:04 AM CST  To: [facility] Singleton Staff  Subject: Requesting advice                                .Type:  Needs Medical Advice/Symptom-based Call    Who Called: self     Symptoms (please be specific): states she has been having chest pains(has medication for this, that usually helps; but did not help the last time she experienced the pain, states the pain could be from digestion) caller states she is not having pain at the moment but would like to know if it comes back should she take baby Asprin?     How long has patient had these symptoms: started late night on 11/23/2024-11/24/2023-caller states pain is gone now     Would the patient rather a call back or a response via [facility]? Call     Best Call Back Number: .[phone]      Additional Information: Denied speaking to a nurse on call; since she is not experiencing pain at the moment; would like to know what to do if she has this pain again

## 2023-11-24 NOTE — TELEPHONE ENCOUNTER
Coronaries have been normal in the past. Ok to not take ASA. Needs to go to the ER with worsening CP

## 2023-11-24 NOTE — TELEPHONE ENCOUNTER
Patient was contacted and given provider's advise. Patient showed good verbal understanding and will keep follow up appointment on 12/06/23.

## 2024-02-26 ENCOUNTER — OFFICE VISIT (OUTPATIENT)
Dept: ENDOCRINOLOGY | Facility: CLINIC | Age: 64
End: 2024-02-26
Payer: COMMERCIAL

## 2024-02-26 VITALS
HEIGHT: 65 IN | WEIGHT: 223.44 LBS | BODY MASS INDEX: 37.23 KG/M2 | SYSTOLIC BLOOD PRESSURE: 132 MMHG | DIASTOLIC BLOOD PRESSURE: 84 MMHG

## 2024-02-26 DIAGNOSIS — R73.03 PREDIABETES: ICD-10-CM

## 2024-02-26 DIAGNOSIS — E06.3 ACQUIRED AUTOIMMUNE HYPOTHYROIDISM: Primary | ICD-10-CM

## 2024-02-26 PROCEDURE — 1159F MED LIST DOCD IN RCRD: CPT | Mod: CPTII,S$GLB,, | Performed by: INTERNAL MEDICINE

## 2024-02-26 PROCEDURE — 3075F SYST BP GE 130 - 139MM HG: CPT | Mod: CPTII,S$GLB,, | Performed by: INTERNAL MEDICINE

## 2024-02-26 PROCEDURE — 99999 PR PBB SHADOW E&M-EST. PATIENT-LVL III: CPT | Mod: PBBFAC,,, | Performed by: INTERNAL MEDICINE

## 2024-02-26 PROCEDURE — 3008F BODY MASS INDEX DOCD: CPT | Mod: CPTII,S$GLB,, | Performed by: INTERNAL MEDICINE

## 2024-02-26 PROCEDURE — 3079F DIAST BP 80-89 MM HG: CPT | Mod: CPTII,S$GLB,, | Performed by: INTERNAL MEDICINE

## 2024-02-26 PROCEDURE — 99204 OFFICE O/P NEW MOD 45 MIN: CPT | Mod: S$GLB,,, | Performed by: INTERNAL MEDICINE

## 2024-02-26 RX ORDER — PREGABALIN 75 MG/1
1 CAPSULE ORAL 2 TIMES DAILY
COMMUNITY
Start: 2023-10-05

## 2024-02-26 NOTE — ASSESSMENT & PLAN NOTE
Clinically and biochemically euthyroid   Needs updated TSH in one month due to recent dose/brand change  Adjust medicine if needed     If TSH within normal range, consider follow up with primary care provider annually for refills and labs.

## 2024-02-26 NOTE — PATIENT INSTRUCTIONS
Goal weight loss 5% body weight ( 11 lbs)    Weigh yourself daily  Simple plan: avoid anything made with flour or sugar.   Increase vegetables, lean proteins and limit carbohydrates.     Choose high fiber carbohydrates, that is a carbohydrate that has more than 3 - 5 grams of fiber per serving. Examples of starchy foods that are good for you are beans, squash.      Drink plenty of water and avoid drinks with sugar such as regular sodas.     No snacking     Nutrition plan:  1) lower carbohydrate plans   2) intermittent fasting   3) Mediterranean style meals     Book:   How to lose weight for the last time, Dr. Annia Cruz   The obesity code Dr. Rolando Miranda

## 2024-02-26 NOTE — PROGRESS NOTES
"Subjective:      Patient ID: Fidelina Schuler is a 63 y.o. female.    Chief Complaint:  Hypothyroidism      History of Present Illness    Hypothyroidism diagnosed 30 years ago   Had undergone FNA in the past by Dr. Felix   Secondary to Hashimoto's.    Seen by Dr. Velez in the past.    Currently reports weight gain.   Also reports mild cold intolerance, depressed mood.   Denies constipation and has a great appetite.     Reports palpitations due to a little too much coffee sometimes. Denies any symptoms of over-treatment including difficulty getting to sleep, restlessness, anxiety or irritability.     Denies neck enlargement, hoarseness, dysphagia    New medications: denies   Supplements: centrum silver   Biotin: has used in the past     Denies amiodarone, lithium use or contrast exposure in the past six weeks.     Current medication   Unithroid 125 mcg since 12/2023  Takes it properly without food first thing in the morning with water. Waits 30 - 45 minutes before breakfast or other pills.    Others tried  Previously on synthroid 112 mcg daily     Lab [facility] 9/2023  TSH 0.262  Repeat labs 10/2023  TSH 11.0    PMHx:   Fatty liver     Review of Systems  Diarrhea after eating fried oysters  Denies URI symptoms     Objective:   Physical Exam  Vitals:    02/26/24 1031   BP: 132/84   BP Location: Left arm   Patient Position: Sitting   BP Method: Large (Manual)   Weight: 101.4 kg (223 lb 7 oz)   Height: 5' 5" (1.651 m)       BP Readings from Last 3 Encounters:   02/26/24 132/84   09/05/23 (!) 136/90   06/08/23 134/74     Wt Readings from Last 1 Encounters:   02/26/24 1031 101.4 kg (223 lb 7 oz)     Body mass index is 37.18 kg/m².    Small thyroid   No cervical LAD     Lab Review:   No results found for: "HGBA1C"  Lab Results   Component Value Date    CHOL 211 (H) 11/10/2011    HDL 48 11/10/2011    LDLCALC 128.4 11/10/2011    TRIG 173 (H) 11/10/2011    CHOLHDL 22.7 11/10/2011     Lab Results   Component Value Date     " 06/15/2020    K 4.6 06/15/2020     06/15/2020    CO2 26 06/15/2020    GLU 93 06/15/2020    BUN 15 06/15/2020    CREATININE 1.0 06/15/2020    CALCIUM 9.7 06/15/2020    PROT 7.2 11/09/2011    ALBUMIN 4.3 11/09/2011    BILITOT 0.6 11/09/2011    ALKPHOS 60 11/09/2011    AST 24 11/09/2011    ALT 44 11/09/2011    ANIONGAP 7 (L) 06/15/2020    ESTGFRAFRICA >60 06/15/2020    EGFRNONAA >60 06/15/2020         Assessment and Plan     Acquired autoimmune hypothyroidism  Clinically and biochemically euthyroid   Needs updated TSH in one month due to recent dose/brand change  Adjust medicine if needed     If TSH within normal range, consider follow up with primary care provider annually for refills and labs.      Prediabetes  Most recent A1C was 5.9%  Notes an increase in A1C with weight increases

## 2024-06-18 ENCOUNTER — PATIENT MESSAGE (OUTPATIENT)
Dept: ENDOCRINOLOGY | Facility: CLINIC | Age: 64
End: 2024-06-18
Payer: COMMERCIAL

## 2024-06-18 DIAGNOSIS — E06.3 ACQUIRED AUTOIMMUNE HYPOTHYROIDISM: Primary | ICD-10-CM

## 2024-06-18 LAB
T4 FREE SERPL-MCNC: 1.29 NG/DL (ref 0.82–1.77)
TSH SERPL DL<=0.005 MIU/L-ACNC: 11.4 UIU/ML (ref 0.45–4.5)

## 2024-06-18 RX ORDER — LEVOTHYROXINE SODIUM 137 UG/1
137 TABLET ORAL
Qty: 30 TABLET | Refills: 11 | Status: SHIPPED | OUTPATIENT
Start: 2024-06-18 | End: 2025-06-18

## 2024-06-18 RX ORDER — LEVOTHYROXINE SODIUM 137 UG/1
137 TABLET ORAL
Qty: 30 TABLET | Refills: 11 | Status: SHIPPED | OUTPATIENT
Start: 2024-06-18 | End: 2024-06-18

## 2025-05-15 ENCOUNTER — TELEPHONE (OUTPATIENT)
Dept: ENDOCRINOLOGY | Facility: CLINIC | Age: 65
End: 2025-05-15
Payer: COMMERCIAL

## 2025-05-15 NOTE — TELEPHONE ENCOUNTER
----- Message from Kaila sent at 5/15/2025 10:43 AM CDT -----  Regarding: Consult Advisory  Contact: Fidelina  CONSULT/ADVISORYName of Caller: FidelinaVi Preference:  [phone] Nature of Call:  Pt states that she sent a message to further discuss her medication change due to her levels fluctuating and her heart palpitation.  NP at the [facility] suggested she reach out to you  to further discuss. Would like a call back to further discuss these changes May need a appt since it has been a year since last visit.

## 2025-05-15 NOTE — TELEPHONE ENCOUNTER
Spoke to pt. Pt states that she has some lab work completed at [facility] and was wondering if  can take a look at it. Pt sates she is fine with completed a virtual visit. Informed pt that  is currently out on vacation but she will get back to her sometime next week.

## 2025-07-09 ENCOUNTER — TELEPHONE (OUTPATIENT)
Dept: GASTROENTEROLOGY | Facility: CLINIC | Age: 65
End: 2025-07-09
Payer: COMMERCIAL

## 2025-07-09 NOTE — TELEPHONE ENCOUNTER
Copied from CRM #[id number]. Topic: Appointments - Appointment Access  >> Jul 9, 2025  2:24 PM Anthony wrote:  Scheduling Request           Appt Type:  Np     Date/Time Preference: next available      Caller Name: pt     Contact Preference: [phone]    Comment: would like to schedule for GERD. Nothing available in epic please call to advise thank you

## 2025-07-10 NOTE — TELEPHONE ENCOUNTER
Left a voicemail for patient explaining  to her Dr. Christina has a private clinic for patient's with functional bowel issue, but offered an appointment with Dr. hCristina's  nurse pracitioner, CASSIE Roberson. Provided clinic call back number

## (undated) DEVICE — KIT HAND CONTROL HIGH PRESSUR

## (undated) DEVICE — CATH DXTERITY NOTO 100CM 6FR

## (undated) DEVICE — OMNIPAQUE 350MG 150ML VIAL

## (undated) DEVICE — PACK CATH LAB

## (undated) DEVICE — CATH DXTERITY JL40 100CM 6FR

## (undated) DEVICE — KIT MANIFOLD LOW PRESS TUBING

## (undated) DEVICE — PAD DEFIB CADENCE ADULT R2

## (undated) DEVICE — INTRODUCER CATH 6F 11CM

## (undated) DEVICE — KIT SYR REUSABLE

## (undated) DEVICE — GUIDEWIRE SAFE T J TIP 145X2.5

## (undated) DEVICE — CATH DXTERITY PIGSTR 110CM 6FR